# Patient Record
Sex: MALE | Race: BLACK OR AFRICAN AMERICAN | Employment: UNEMPLOYED | URBAN - METROPOLITAN AREA
[De-identification: names, ages, dates, MRNs, and addresses within clinical notes are randomized per-mention and may not be internally consistent; named-entity substitution may affect disease eponyms.]

---

## 2021-11-11 ENCOUNTER — HOSPITAL ENCOUNTER (EMERGENCY)
Facility: CLINIC | Age: 36
Discharge: HOME | End: 2021-11-12
Attending: EMERGENCY MEDICINE | Admitting: EMERGENCY MEDICINE
Payer: COMMERCIAL

## 2021-11-11 ENCOUNTER — APPOINTMENT (OUTPATIENT)
Dept: GENERAL RADIOLOGY | Facility: CLINIC | Age: 36
End: 2021-11-11
Attending: EMERGENCY MEDICINE
Payer: COMMERCIAL

## 2021-11-11 VITALS
RESPIRATION RATE: 18 BRPM | OXYGEN SATURATION: 98 % | HEART RATE: 92 BPM | SYSTOLIC BLOOD PRESSURE: 145 MMHG | TEMPERATURE: 103.3 F | DIASTOLIC BLOOD PRESSURE: 73 MMHG

## 2021-11-11 DIAGNOSIS — B34.9 VIRAL SYNDROME: ICD-10-CM

## 2021-11-11 DIAGNOSIS — Z20.822 SUSPECTED 2019 NOVEL CORONAVIRUS INFECTION: ICD-10-CM

## 2021-11-11 PROBLEM — N48.6 PEYRONIE DISEASE: Status: ACTIVE | Noted: 2019-10-11

## 2021-11-11 PROBLEM — G47.33 OSA (OBSTRUCTIVE SLEEP APNEA): Status: ACTIVE | Noted: 2021-11-11

## 2021-11-11 LAB — DEPRECATED S PYO AG THROAT QL EIA: NEGATIVE

## 2021-11-11 PROCEDURE — 87637 SARSCOV2&INF A&B&RSV AMP PRB: CPT | Performed by: EMERGENCY MEDICINE

## 2021-11-11 PROCEDURE — 71045 X-RAY EXAM CHEST 1 VIEW: CPT

## 2021-11-11 PROCEDURE — 99284 EMERGENCY DEPT VISIT MOD MDM: CPT | Mod: 25

## 2021-11-11 PROCEDURE — C9803 HOPD COVID-19 SPEC COLLECT: HCPCS

## 2021-11-11 PROCEDURE — 87651 STREP A DNA AMP PROBE: CPT | Performed by: EMERGENCY MEDICINE

## 2021-11-11 RX ORDER — IBUPROFEN 600 MG/1
600 TABLET, FILM COATED ORAL EVERY 6 HOURS PRN
Qty: 30 TABLET | Refills: 0 | Status: SHIPPED | OUTPATIENT
Start: 2021-11-11 | End: 2021-12-11

## 2021-11-11 ASSESSMENT — ENCOUNTER SYMPTOMS
FEVER: 1
HEADACHES: 1

## 2021-11-12 LAB
FLUAV RNA SPEC QL NAA+PROBE: NEGATIVE
FLUBV RNA RESP QL NAA+PROBE: NEGATIVE
GROUP A STREP BY PCR: NOT DETECTED
RSV RNA SPEC NAA+PROBE: NEGATIVE
SARS-COV-2 RNA RESP QL NAA+PROBE: NEGATIVE

## 2021-11-12 NOTE — RESULT ENCOUNTER NOTE
Negative for Influenza A, Influenza B, RSV and Covid19.  Patient will receive the Covid19 result via [employer] and a letter will be sent via [employer] (if active) or via the mail

## 2021-11-12 NOTE — ED PROVIDER NOTES
History   Chief Complaint:  Fever     The history is provided by the patient.      Akash James is a 36 year old male who presents for evaluation of fever which has been ongoing for the last 5 days.  The patient reports that he had a sleep study on Monday and since then he has had fevers, fatigue, cough, body aches, mild headaches, nausea and vomiting intermittently.  Patient is not COVID vaccinated.  He denies any other known ill contacts.  He denies any urinary symptoms, abdominal pain, rash.  No shortness of breath or chest pain.  Patient reports that he does not use medications and therefore has not taken anything to help with his symptoms. He states his temperatures at home have been as high as 103.  No confusion or lethargy.  Patient notes the most predominant symptom is his fatigue.    Review of Systems   Constitutional: Positive for fever.   Eyes: Positive for visual disturbance.   Neurological: Positive for headaches.   All other systems reviewed and are negative.    Allergies:  Cat  Dog    Medications:  Vitamin D    Past Medical History:    Peyronie disease  Caries  Paranoid schizophrenia  CARLY     Social History:  The patient presents to the ED alone.  Tobacco Use: patient denies smoking or vaping    Physical Exam     Patient Vitals for the past 24 hrs:   BP Temp Temp src Pulse Resp SpO2   11/11/21 2142 -- (!) 103.3  F (39.6  C) Oral -- -- --   11/11/21 2108 (!) 145/73 100.4  F (38  C) Temporal 92 18 98 %       Physical Exam  General: Well appearing, nontoxic. Resting comfortably  Head:  Scalp, face, and head appear normal  Eyes:  Pupils are equal, round    Conjunctivae non-injected and sclerae white  ENT:    The external nose is normal    Posterior pharynx is clear without erythema swelling exudates.  Mucous membranes moist.    Pinnae are normal  Neck:  Normal range of motion    There is no rigidity noted    Trachea is in the midline  CV:  Regular rate and rhythm     Normal S1/S2, no S3/S4    No  murmur or rub. Radial pulses 2+ bilaterally.  Resp:  Lungs are clear and equal bilaterally  There is no tachypnea    No increased work of breathing    No rales, wheezing, or rhonchi  GI:  Abdomen is soft, no rigidity or guarding    No distension, or mass    No tenderness or rebound tenderness   MS:  Normal muscular tone    Symmetric motor strength    No lower extremity edema  Skin:  No rash or acute skin lesions noted  Neuro: Awake and alert  Speech is normal and fluent  Moves all extremities spontaneously  Psych:  Normal affect. Appropriate interactions.     Emergency Department Course   Imaging:  XR Chest Port 1 View   Final Result   IMPRESSION: Negative chest.      Report per radiology     Laboratory:  Labs Ordered and Resulted from Time of ED Arrival to Time of ED Departure   STREPTOCOCCUS A RAPID SCREEN W REFELX TO PCR - Normal       Result Value    Group A Strep antigen Negative     GROUP A STREPTOCOCCUS PCR THROAT SWAB     Emergency Department Course:  Reviewed:  I reviewed nursing notes, vitals, past medical history and care everywhere    Assessments and Consults:  ED Course as of 11/12/21 0131   u Nov 11, 2021 2131  performed physical exam of the patient with findings as above.      Disposition:  The patient was discharged to home.     Impression & Plan   Covid-19  Akash James was evaluated during a global COVID-19 pandemic, which necessitated consideration that the patient might be at risk for infection with the SARS-CoV-2 virus that causes COVID-19.   Applicable protocols for evaluation were followed during the patient's care.   COVID-19 was considered as part of the patient's evaluation. The plan for testing is: a test was obtained during this visit.     Medical Decision Making:  Akash James is a 36 year old male who.  On my evaluation he is well-appearing, hemodynamically stable and afebrile.  No increased work of breathing, rest or distress or hypoxia.  Abdominal exam is benign  "without evidence of peritonitis or acute surgical emergency.  No abdominal complaints to indicate a primary intra-abdominal infection.  A broad differential diagnosis is considered.  He was febrile to 103.3 in the emergency department.  I offered the patient Tylenol and/or ibuprofen however he declined saying that \"I do not take any medications\".  I discussed with him that getting his fever down will help him feel better however he continued to refuse any antipyretics in the emergency department.  Chest x-ray obtained and negative for pneumonia or other acute findings.  Rapid strep test was completed and negative.  Influenza, COVID-19 and RSV viral testing are sent and pending at this time.  No evidence for skin or soft tissue infection.  Patient is overall very well-appearing, nontoxic.  At this time I feel that symptoms are due to underlying viral syndrome including possible COVID-19 infection.  Patient is not vaccinated.  Symptoms are mild at this point and there is no negation for further emergency department evaluation or hospitalization.  I recommended supportive care with Tylenol, ibuprofen, over-the-counter cough and cold medication, good oral fluid intake and rest.  Patient will be called if his viral testing returns positive.  I stressed the importance of close outpatient primary care follow-up and immediate return to the emergency department for any worsening.  The patient was agreeable with plan of care, close return precautions were provided and the patient was discharged in stable condition.    Diagnosis:    ICD-10-CM    1. Viral syndrome  B34.9    2. Suspected 2019 novel coronavirus infection  Z20.822      Discharge Medications:  Discharge Medication List as of 11/12/2021 12:41 AM      START taking these medications    Details   acetaminophen 500 MG CAPS Take 2 capsules by mouth every 8 hours as needed For aches, pain, fever, Disp-60 capsule, R-0, Local Print      ibuprofen (ADVIL/MOTRIN) 600 MG " tablet Take 1 tablet (600 mg) by mouth every 6 hours as needed for fever Take with food., Disp-30 tablet, R-0, Local Print           Scribe Disclosure:  I, Praneeth Emanuel, am serving as a scribe at 9:17 PM on 11/11/2021 to document services personally performed by Dimas Romero MD based on my observations and the provider's statements to me.     [facility]         Dimas Romero MD  11/13/21 6145

## 2021-11-13 ENCOUNTER — TELEPHONE (OUTPATIENT)
Dept: EMERGENCY MEDICINE | Facility: CLINIC | Age: 36
End: 2021-11-13
Payer: COMMERCIAL

## 2021-11-13 NOTE — TELEPHONE ENCOUNTER
Coronavirus (COVID-19) Notification    Lab Result   Lab test 2019-nCoV rRt-PCR OR SARS-COV-2 PCR    Nasopharyngeal AND/OR Oropharyngeal swab is NEGATIVE for 2019-nCoV RNA [OR] SARS-COV-2 RNA (COVID-19) RNA    Your result was negative. This means that we didn't find the virus that causes COVID-19 in your sample. A test may show negative when you do actually have the virus. This can happen when the virus is in the early stages of infection, before you feel illness symptoms.    If you have symptoms   Stay home and away from others (self-isolate) until you meet ALL of the guidelines below:    You've had no fever--and no medicine that reduces fever--for 1 full day (24 hours). And      Your other symptoms have gotten better. For example, your cough or breathing has improved. And     At least 10 days have passed since your symptoms started.       Group A Streptococcus PCR is NEGATIVE  No treatment or change in treatment [facility] ED lab result Strep Group A protocol.      Negative for Influenza A, Influenza B, RSV and Covid19.  Patient will receive the Covid19 result via [employer] and a letter will be sent via [employer] (if active) or via the mail    Advised to follow up with the PCP as directed by the ED provider, return to the ED with any worsening symptoms.  The patient is comfortable with the information given and has no further questions.       Vikki Lowe RN

## 2021-11-26 ENCOUNTER — APPOINTMENT (OUTPATIENT)
Dept: MRI IMAGING | Facility: CLINIC | Age: 36
End: 2021-11-26
Attending: INTERNAL MEDICINE
Payer: COMMERCIAL

## 2021-11-26 ENCOUNTER — APPOINTMENT (OUTPATIENT)
Dept: GENERAL RADIOLOGY | Facility: CLINIC | Age: 36
End: 2021-11-26
Attending: EMERGENCY MEDICINE
Payer: COMMERCIAL

## 2021-11-26 ENCOUNTER — APPOINTMENT (OUTPATIENT)
Dept: CT IMAGING | Facility: CLINIC | Age: 36
End: 2021-11-26
Attending: EMERGENCY MEDICINE
Payer: COMMERCIAL

## 2021-11-26 ENCOUNTER — HOSPITAL ENCOUNTER (INPATIENT)
Facility: CLINIC | Age: 36
LOS: 4 days | Discharge: HOME | End: 2021-11-30
Attending: EMERGENCY MEDICINE | Admitting: INTERNAL MEDICINE
Payer: COMMERCIAL

## 2021-11-26 DIAGNOSIS — R51.9 NONINTRACTABLE HEADACHE, UNSPECIFIED CHRONICITY PATTERN, UNSPECIFIED HEADACHE TYPE: ICD-10-CM

## 2021-11-26 DIAGNOSIS — K59.00 CONSTIPATION, UNSPECIFIED CONSTIPATION TYPE: Primary | ICD-10-CM

## 2021-11-26 DIAGNOSIS — R53.1 WEAKNESS GENERALIZED: ICD-10-CM

## 2021-11-26 DIAGNOSIS — G04.00 ADEM (ACUTE DISSEMINATED ENCEPHALOMYELITIS): ICD-10-CM

## 2021-11-26 LAB
ALBUMIN SERPL-MCNC: 3.6 G/DL (ref 3.4–5)
ALBUMIN UR-MCNC: NEGATIVE MG/DL
ALP SERPL-CCNC: 43 U/L (ref 40–150)
ALT SERPL W P-5'-P-CCNC: 15 U/L (ref 0–70)
ANION GAP SERPL CALCULATED.3IONS-SCNC: 3 MMOL/L (ref 3–14)
APPEARANCE CSF: CLEAR
APPEARANCE UR: CLEAR
AST SERPL W P-5'-P-CCNC: 12 U/L (ref 0–45)
ATRIAL RATE - MUSE: 82 BPM
BACTERIA #/AREA URNS HPF: ABNORMAL /HPF
BASOPHILS # BLD AUTO: 0 10E3/UL (ref 0–0.2)
BASOPHILS NFR BLD AUTO: 1 %
BILIRUB SERPL-MCNC: 0.5 MG/DL (ref 0.2–1.3)
BILIRUB UR QL STRIP: NEGATIVE
BUN SERPL-MCNC: 9 MG/DL (ref 7–30)
CALCIUM SERPL-MCNC: 9 MG/DL (ref 8.5–10.1)
CHLORIDE BLD-SCNC: 99 MMOL/L (ref 94–109)
CK SERPL-CCNC: 106 U/L (ref 30–300)
CK SERPL-CCNC: 115 U/L (ref 30–300)
CO2 SERPL-SCNC: 30 MMOL/L (ref 20–32)
COLOR CSF: COLORLESS
COLOR UR AUTO: ABNORMAL
CREAT SERPL-MCNC: 1.05 MG/DL (ref 0.66–1.25)
CRP SERPL-MCNC: <2.9 MG/L (ref 0–8)
DEPRECATED S PYO AG THROAT QL EIA: POSITIVE
DIASTOLIC BLOOD PRESSURE - MUSE: NORMAL MMHG
EOSINOPHIL # BLD AUTO: 0.1 10E3/UL (ref 0–0.7)
EOSINOPHIL NFR BLD AUTO: 2 %
ERYTHROCYTE [DISTWIDTH] IN BLOOD BY AUTOMATED COUNT: 11.9 % (ref 10–15)
ERYTHROCYTE [SEDIMENTATION RATE] IN BLOOD BY WESTERGREN METHOD: 16 MM/HR (ref 0–15)
FLUAV RNA SPEC QL NAA+PROBE: NEGATIVE
FLUBV RNA RESP QL NAA+PROBE: NEGATIVE
GFR SERPL CREATININE-BSD FRML MDRD: >90 ML/MIN/1.73M2
GLUCOSE BLD-MCNC: 112 MG/DL (ref 70–99)
GLUCOSE CSF-MCNC: 63 MG/DL (ref 40–70)
GLUCOSE UR STRIP-MCNC: NEGATIVE MG/DL
HCO3 BLDV-SCNC: 30 MMOL/L (ref 21–28)
HCT VFR BLD AUTO: 42.6 % (ref 40–53)
HGB BLD-MCNC: 14.8 G/DL (ref 13.3–17.7)
HGB UR QL STRIP: NEGATIVE
HOLD SPECIMEN: NORMAL
HSV1 DNA CSF QL NAA+PROBE: NOT DETECTED
HSV2 DNA CSF QL NAA+PROBE: NOT DETECTED
IMM GRANULOCYTES # BLD: 0 10E3/UL
IMM GRANULOCYTES NFR BLD: 0 %
INTERPRETATION ECG - MUSE: NORMAL
KETONES UR STRIP-MCNC: ABNORMAL MG/DL
LACTATE BLD-SCNC: 0.6 MMOL/L
LEUKOCYTE ESTERASE UR QL STRIP: NEGATIVE
LYMPH ABN NFR CSF MANUAL: 68 %
LYMPHOCYTES # BLD AUTO: 1.3 10E3/UL (ref 0.8–5.3)
LYMPHOCYTES NFR BLD AUTO: 32 %
Lab: NORMAL
Lab: NORMAL
MCH RBC QN AUTO: 31.2 PG (ref 26.5–33)
MCHC RBC AUTO-ENTMCNC: 34.7 G/DL (ref 31.5–36.5)
MCV RBC AUTO: 90 FL (ref 78–100)
MONOCYTES # BLD AUTO: 0.6 10E3/UL (ref 0–1.3)
MONOCYTES NFR BLD AUTO: 15 %
MONOS+MACROS NFR CSF MANUAL: 6 %
MUCOUS THREADS #/AREA URNS LPF: PRESENT /LPF
NEUTROPHILS # BLD AUTO: 2 10E3/UL (ref 1.6–8.3)
NEUTROPHILS NFR BLD AUTO: 50 %
NEUTROPHILS NFR CSF MANUAL: 26 %
NITRATE UR QL: NEGATIVE
NRBC # BLD AUTO: 0 10E3/UL
NRBC BLD AUTO-RTO: 0 /100
P AXIS - MUSE: 76 DEGREES
PCO2 BLDV: 49 MM HG (ref 40–50)
PERFORMING LABORATORY: NORMAL
PERFORMING LABORATORY: NORMAL
PH BLDV: 7.4 [PH] (ref 7.32–7.43)
PH UR STRIP: 6 [PH] (ref 5–7)
PLATELET # BLD AUTO: 320 10E3/UL (ref 150–450)
PO2 BLDV: 43 MM HG (ref 25–47)
POTASSIUM BLD-SCNC: 3.9 MMOL/L (ref 3.4–5.3)
PR INTERVAL - MUSE: 154 MS
PROT CSF-MCNC: 64 MG/DL (ref 15–60)
PROT SERPL-MCNC: 7.4 G/DL (ref 6.8–8.8)
QRS DURATION - MUSE: 78 MS
QT - MUSE: 336 MS
QTC - MUSE: 392 MS
R AXIS - MUSE: 73 DEGREES
RBC # BLD AUTO: 4.75 10E6/UL (ref 4.4–5.9)
RBC # CSF MANUAL: 109 /UL (ref 0–2)
RBC URINE: <1 /HPF
SAO2 % BLDV: 78 % (ref 94–100)
SARS-COV-2 RNA RESP QL NAA+PROBE: NEGATIVE
SODIUM SERPL-SCNC: 132 MMOL/L (ref 133–144)
SP GR UR STRIP: 1.01 (ref 1–1.03)
SPECIMEN STATUS: NORMAL
SPECIMEN STATUS: NORMAL
SYSTOLIC BLOOD PRESSURE - MUSE: NORMAL MMHG
T AXIS - MUSE: 45 DEGREES
TROPONIN I SERPL HS-MCNC: 6 NG/L
TSH SERPL DL<=0.005 MIU/L-ACNC: 2.12 MU/L (ref 0.4–4)
TUBE # CSF: 1
UROBILINOGEN UR STRIP-MCNC: 2 MG/DL
VENTRICULAR RATE- MUSE: 82 BPM
VIT B12 SERPL-MCNC: 227 PG/ML (ref 193–986)
WBC # BLD AUTO: 4 10E3/UL (ref 4–11)
WBC # CSF MANUAL: 32 /UL (ref 0–5)
WBC URINE: 1 /HPF

## 2021-11-26 PROCEDURE — 36415 COLL VENOUS BLD VENIPUNCTURE: CPT | Performed by: EMERGENCY MEDICINE

## 2021-11-26 PROCEDURE — 71046 X-RAY EXAM CHEST 2 VIEWS: CPT

## 2021-11-26 PROCEDURE — 99207 PR APP CREDIT; MD BILLING SHARED VISIT: CPT | Performed by: INTERNAL MEDICINE

## 2021-11-26 PROCEDURE — 258N000003 HC RX IP 258 OP 636: Performed by: EMERGENCY MEDICINE

## 2021-11-26 PROCEDURE — 87070 CULTURE OTHR SPECIMN AEROBIC: CPT | Performed by: EMERGENCY MEDICINE

## 2021-11-26 PROCEDURE — 84484 ASSAY OF TROPONIN QUANT: CPT | Performed by: EMERGENCY MEDICINE

## 2021-11-26 PROCEDURE — 999N000157 HC STATISTIC RCP TIME EA 10 MIN

## 2021-11-26 PROCEDURE — 84999 UNLISTED CHEMISTRY PROCEDURE: CPT | Performed by: PSYCHIATRY & NEUROLOGY

## 2021-11-26 PROCEDURE — 99223 1ST HOSP IP/OBS HIGH 75: CPT | Mod: AI | Performed by: INTERNAL MEDICINE

## 2021-11-26 PROCEDURE — 86140 C-REACTIVE PROTEIN: CPT | Performed by: EMERGENCY MEDICINE

## 2021-11-26 PROCEDURE — 87389 HIV-1 AG W/HIV-1&-2 AB AG IA: CPT | Performed by: INTERNAL MEDICINE

## 2021-11-26 PROCEDURE — 82550 ASSAY OF CK (CPK): CPT | Performed by: EMERGENCY MEDICINE

## 2021-11-26 PROCEDURE — 70553 MRI BRAIN STEM W/O & W/DYE: CPT

## 2021-11-26 PROCEDURE — 120N000004 HC R&B MS OVERFLOW

## 2021-11-26 PROCEDURE — 80053 COMPREHEN METABOLIC PANEL: CPT | Performed by: EMERGENCY MEDICINE

## 2021-11-26 PROCEDURE — 250N000011 HC RX IP 250 OP 636: Performed by: INTERNAL MEDICINE

## 2021-11-26 PROCEDURE — 250N000013 HC RX MED GY IP 250 OP 250 PS 637: Performed by: INTERNAL MEDICINE

## 2021-11-26 PROCEDURE — 81001 URINALYSIS AUTO W/SCOPE: CPT | Performed by: EMERGENCY MEDICINE

## 2021-11-26 PROCEDURE — 96361 HYDRATE IV INFUSION ADD-ON: CPT

## 2021-11-26 PROCEDURE — 86255 FLUORESCENT ANTIBODY SCREEN: CPT | Performed by: PSYCHIATRY & NEUROLOGY

## 2021-11-26 PROCEDURE — 36415 COLL VENOUS BLD VENIPUNCTURE: CPT | Performed by: PSYCHIATRY & NEUROLOGY

## 2021-11-26 PROCEDURE — 85652 RBC SED RATE AUTOMATED: CPT | Performed by: EMERGENCY MEDICINE

## 2021-11-26 PROCEDURE — 255N000002 HC RX 255 OP 636: Performed by: INTERNAL MEDICINE

## 2021-11-26 PROCEDURE — 82306 VITAMIN D 25 HYDROXY: CPT | Performed by: PSYCHIATRY & NEUROLOGY

## 2021-11-26 PROCEDURE — 82164 ANGIOTENSIN I ENZYME TEST: CPT | Performed by: PSYCHIATRY & NEUROLOGY

## 2021-11-26 PROCEDURE — 93005 ELECTROCARDIOGRAM TRACING: CPT

## 2021-11-26 PROCEDURE — 86235 NUCLEAR ANTIGEN ANTIBODY: CPT | Performed by: PSYCHIATRY & NEUROLOGY

## 2021-11-26 PROCEDURE — 62270 DX LMBR SPI PNXR: CPT

## 2021-11-26 PROCEDURE — 96375 TX/PRO/DX INJ NEW DRUG ADDON: CPT

## 2021-11-26 PROCEDURE — 87880 STREP A ASSAY W/OPTIC: CPT | Performed by: EMERGENCY MEDICINE

## 2021-11-26 PROCEDURE — 70450 CT HEAD/BRAIN W/O DYE: CPT

## 2021-11-26 PROCEDURE — 99285 EMERGENCY DEPT VISIT HI MDM: CPT | Mod: 25

## 2021-11-26 PROCEDURE — 87529 HSV DNA AMP PROBE: CPT | Performed by: EMERGENCY MEDICINE

## 2021-11-26 PROCEDURE — 72157 MRI CHEST SPINE W/O & W/DYE: CPT

## 2021-11-26 PROCEDURE — 250N000011 HC RX IP 250 OP 636: Performed by: EMERGENCY MEDICINE

## 2021-11-26 PROCEDURE — 86617 LYME DISEASE ANTIBODY: CPT | Performed by: EMERGENCY MEDICINE

## 2021-11-26 PROCEDURE — 96374 THER/PROPH/DIAG INJ IV PUSH: CPT | Mod: 59

## 2021-11-26 PROCEDURE — 86255 FLUORESCENT ANTIBODY SCREEN: CPT | Performed by: INTERNAL MEDICINE

## 2021-11-26 PROCEDURE — 82607 VITAMIN B-12: CPT | Performed by: PSYCHIATRY & NEUROLOGY

## 2021-11-26 PROCEDURE — 86618 LYME DISEASE ANTIBODY: CPT | Performed by: PSYCHIATRY & NEUROLOGY

## 2021-11-26 PROCEDURE — 72156 MRI NECK SPINE W/O & W/DYE: CPT

## 2021-11-26 PROCEDURE — 84157 ASSAY OF PROTEIN OTHER: CPT | Performed by: EMERGENCY MEDICINE

## 2021-11-26 PROCEDURE — 258N000003 HC RX IP 258 OP 636: Performed by: INTERNAL MEDICINE

## 2021-11-26 PROCEDURE — 82550 ASSAY OF CK (CPK): CPT | Performed by: INTERNAL MEDICINE

## 2021-11-26 PROCEDURE — 82945 GLUCOSE OTHER FLUID: CPT | Performed by: EMERGENCY MEDICINE

## 2021-11-26 PROCEDURE — 86780 TREPONEMA PALLIDUM: CPT | Performed by: PSYCHIATRY & NEUROLOGY

## 2021-11-26 PROCEDURE — 85004 AUTOMATED DIFF WBC COUNT: CPT | Performed by: EMERGENCY MEDICINE

## 2021-11-26 PROCEDURE — 82803 BLOOD GASES ANY COMBINATION: CPT

## 2021-11-26 PROCEDURE — 72158 MRI LUMBAR SPINE W/O & W/DYE: CPT

## 2021-11-26 PROCEDURE — 87252 VIRUS INOCULATION TISSUE: CPT | Performed by: EMERGENCY MEDICINE

## 2021-11-26 PROCEDURE — 94660 CPAP INITIATION&MGMT: CPT

## 2021-11-26 PROCEDURE — 89051 BODY FLUID CELL COUNT: CPT | Performed by: EMERGENCY MEDICINE

## 2021-11-26 PROCEDURE — 83916 OLIGOCLONAL BANDS: CPT | Performed by: EMERGENCY MEDICINE

## 2021-11-26 PROCEDURE — A9585 GADOBUTROL INJECTION: HCPCS | Performed by: INTERNAL MEDICINE

## 2021-11-26 PROCEDURE — 87040 BLOOD CULTURE FOR BACTERIA: CPT | Performed by: EMERGENCY MEDICINE

## 2021-11-26 PROCEDURE — 87636 SARSCOV2 & INF A&B AMP PRB: CPT | Performed by: EMERGENCY MEDICINE

## 2021-11-26 PROCEDURE — C9803 HOPD COVID-19 SPEC COLLECT: HCPCS

## 2021-11-26 PROCEDURE — 86225 DNA ANTIBODY NATIVE: CPT | Performed by: PSYCHIATRY & NEUROLOGY

## 2021-11-26 PROCEDURE — 84443 ASSAY THYROID STIM HORMONE: CPT | Performed by: PSYCHIATRY & NEUROLOGY

## 2021-11-26 RX ORDER — METOCLOPRAMIDE HYDROCHLORIDE 5 MG/ML
10 INJECTION INTRAMUSCULAR; INTRAVENOUS ONCE
Status: COMPLETED | OUTPATIENT
Start: 2021-11-26 | End: 2021-11-26

## 2021-11-26 RX ORDER — ONDANSETRON 2 MG/ML
4 INJECTION INTRAMUSCULAR; INTRAVENOUS ONCE
Status: COMPLETED | OUTPATIENT
Start: 2021-11-26 | End: 2021-11-26

## 2021-11-26 RX ORDER — AMOXICILLIN 250 MG
2 CAPSULE ORAL 2 TIMES DAILY PRN
Status: DISCONTINUED | OUTPATIENT
Start: 2021-11-26 | End: 2021-11-30 | Stop reason: HOSPADM

## 2021-11-26 RX ORDER — ONDANSETRON 2 MG/ML
4 INJECTION INTRAMUSCULAR; INTRAVENOUS EVERY 6 HOURS PRN
Status: DISCONTINUED | OUTPATIENT
Start: 2021-11-26 | End: 2021-11-30 | Stop reason: HOSPADM

## 2021-11-26 RX ORDER — ONDANSETRON 4 MG/1
4 TABLET, ORALLY DISINTEGRATING ORAL EVERY 6 HOURS PRN
Status: DISCONTINUED | OUTPATIENT
Start: 2021-11-26 | End: 2021-11-30 | Stop reason: HOSPADM

## 2021-11-26 RX ORDER — DIPHENHYDRAMINE HYDROCHLORIDE 50 MG/ML
25 INJECTION INTRAMUSCULAR; INTRAVENOUS ONCE
Status: DISCONTINUED | OUTPATIENT
Start: 2021-11-26 | End: 2021-11-26

## 2021-11-26 RX ORDER — ACETAMINOPHEN 650 MG/1
650 SUPPOSITORY RECTAL EVERY 6 HOURS PRN
Status: DISCONTINUED | OUTPATIENT
Start: 2021-11-26 | End: 2021-11-30 | Stop reason: HOSPADM

## 2021-11-26 RX ORDER — ACETAMINOPHEN 325 MG/1
975 TABLET ORAL ONCE
Status: DISCONTINUED | OUTPATIENT
Start: 2021-11-26 | End: 2021-11-26

## 2021-11-26 RX ORDER — GADOBUTROL 604.72 MG/ML
10 INJECTION INTRAVENOUS ONCE
Status: COMPLETED | OUTPATIENT
Start: 2021-11-26 | End: 2021-11-26

## 2021-11-26 RX ORDER — ERGOCALCIFEROL 1.25 MG/1
50000 CAPSULE ORAL WEEKLY
COMMUNITY

## 2021-11-26 RX ORDER — MAGNESIUM HYDROXIDE/ALUMINUM HYDROXICE/SIMETHICONE 120; 1200; 1200 MG/30ML; MG/30ML; MG/30ML
30 SUSPENSION ORAL EVERY 4 HOURS PRN
Status: DISCONTINUED | OUTPATIENT
Start: 2021-11-26 | End: 2021-11-30 | Stop reason: HOSPADM

## 2021-11-26 RX ORDER — LIDOCAINE HYDROCHLORIDE 40 MG/ML
1 INJECTION, SOLUTION RETROBULBAR ONCE
Status: DISCONTINUED | OUTPATIENT
Start: 2021-11-26 | End: 2021-11-26

## 2021-11-26 RX ORDER — AMOXICILLIN 250 MG
1 CAPSULE ORAL 2 TIMES DAILY PRN
Status: DISCONTINUED | OUTPATIENT
Start: 2021-11-26 | End: 2021-11-30 | Stop reason: HOSPADM

## 2021-11-26 RX ORDER — LIDOCAINE 40 MG/G
CREAM TOPICAL
Status: DISCONTINUED | OUTPATIENT
Start: 2021-11-26 | End: 2021-11-30 | Stop reason: HOSPADM

## 2021-11-26 RX ORDER — LANOLIN ALCOHOL/MO/W.PET/CERES
3 CREAM (GRAM) TOPICAL
Status: DISCONTINUED | OUTPATIENT
Start: 2021-11-26 | End: 2021-11-30 | Stop reason: HOSPADM

## 2021-11-26 RX ORDER — KETOROLAC TROMETHAMINE 15 MG/ML
10 INJECTION, SOLUTION INTRAMUSCULAR; INTRAVENOUS ONCE
Status: COMPLETED | OUTPATIENT
Start: 2021-11-26 | End: 2021-11-26

## 2021-11-26 RX ORDER — ACETAMINOPHEN 325 MG/1
650 TABLET ORAL EVERY 6 HOURS PRN
Status: DISCONTINUED | OUTPATIENT
Start: 2021-11-26 | End: 2021-11-30 | Stop reason: HOSPADM

## 2021-11-26 RX ORDER — BISACODYL 10 MG
10 SUPPOSITORY, RECTAL RECTAL DAILY PRN
Status: DISCONTINUED | OUTPATIENT
Start: 2021-11-26 | End: 2021-11-30 | Stop reason: HOSPADM

## 2021-11-26 RX ADMIN — METOCLOPRAMIDE HYDROCHLORIDE 10 MG: 5 INJECTION INTRAMUSCULAR; INTRAVENOUS at 05:04

## 2021-11-26 RX ADMIN — SODIUM CHLORIDE 1000 MG: 9 INJECTION, SOLUTION INTRAVENOUS at 14:59

## 2021-11-26 RX ADMIN — GADOBUTROL 8 ML: 604.72 INJECTION INTRAVENOUS at 10:49

## 2021-11-26 RX ADMIN — ONDANSETRON 4 MG: 2 INJECTION INTRAMUSCULAR; INTRAVENOUS at 02:01

## 2021-11-26 RX ADMIN — PENICILLIN G BENZATHINE 1.2 MILLION UNITS: 1200000 INJECTION, SUSPENSION INTRAMUSCULAR at 12:30

## 2021-11-26 RX ADMIN — MAGNESIUM HYDROXIDE 30 ML: 400 SUSPENSION ORAL at 18:27

## 2021-11-26 RX ADMIN — SODIUM CHLORIDE 1000 ML: 9 INJECTION, SOLUTION INTRAVENOUS at 01:44

## 2021-11-26 RX ADMIN — KETOROLAC TROMETHAMINE 10 MG: 15 INJECTION, SOLUTION INTRAMUSCULAR; INTRAVENOUS at 03:53

## 2021-11-26 ASSESSMENT — ENCOUNTER SYMPTOMS
FEVER: 1
VOMITING: 1
NAUSEA: 1
ARTHRALGIAS: 1
HEADACHES: 1
NUMBNESS: 1
CHILLS: 1
COUGH: 0
WEAKNESS: 0
CONSTIPATION: 1
UNEXPECTED WEIGHT CHANGE: 1
DYSURIA: 0
MYALGIAS: 1
BACK PAIN: 0
HALLUCINATIONS: 0
PHOTOPHOBIA: 1

## 2021-11-26 ASSESSMENT — MIFFLIN-ST. JEOR
SCORE: 1564.49
SCORE: 1563.12

## 2021-11-26 ASSESSMENT — ACTIVITIES OF DAILY LIVING (ADL)
ADLS_ACUITY_SCORE: 14
ADLS_ACUITY_SCORE: 12
ADLS_ACUITY_SCORE: 6
ADLS_ACUITY_SCORE: 12
ADLS_ACUITY_SCORE: 6
ADLS_ACUITY_SCORE: 8
ADLS_ACUITY_SCORE: 8
ADLS_ACUITY_SCORE: 12
ADLS_ACUITY_SCORE: 8
ADLS_ACUITY_SCORE: 12
ADLS_ACUITY_SCORE: 12

## 2021-11-26 NOTE — ED NOTES
Attempted to ambulate patient, patient reports numbness to bilateral lower extremities causing difficulty walking. Patient denies trauma

## 2021-11-26 NOTE — ED PROVIDER NOTES
History   Chief Complaint:  Flu Symptoms     The history is provided by the patient and a significant other.      Akash James is a 36 year old male who presents with flu symptoms. Patient seen here on 11/11 for similar symptoms and had negative Covid, strep, and flu tests. Returns today for ongoing headache, myalgias, nausea and vomiting. He rates his headache as a 10/10 for the past three weeks. He complains of left sided and posterior headache. Presents today because family concerned and he is declining since his previous visit. He has lost 15 pounds. Girlfriend notes he has urinated on himself because he can't feel anything in that area. He is also constipated. He notes numbness down the anterior of his bilateral legs. He has been having fevers with high of 103 two weeks ago with continued fever and chills. He has bilateral leg tingling and unable to walk which started recently. He is photophobic. He notes blurriness out of one eye. These symptoms have been ongoing for three weeks. Denies dysuria or cough. No rash. No back pain. No weakness. Denies hallucinations or thoughts of wanting to harm himself.     Review of Systems   Constitutional: Positive for chills, fever and unexpected weight change.   Eyes: Positive for photophobia and visual disturbance.   Respiratory: Negative for cough.    Gastrointestinal: Positive for constipation, nausea and vomiting.   Genitourinary: Negative for dysuria and enuresis.   Musculoskeletal: Positive for arthralgias, gait problem and myalgias. Negative for back pain.   Skin: Negative for rash.   Neurological: Positive for numbness and headaches. Negative for weakness.   Psychiatric/Behavioral: Negative for hallucinations and suicidal ideas.   All other systems reviewed and are negative.    Allergies:  Dog  Uncaria tomentosa    Medications:  The patient denies use of medications.     Past Medical History:     Peyronie disease  Paranoid schizophrenia  CARLY  Caries    Chlamydia  Gonorrhea     Migraine without aura     Social History:  Presents to ED with girlfriend  No alcohol, tobacco, or drug use.     Physical Exam     Patient Vitals for the past 24 hrs:   BP Temp Temp src Pulse Resp SpO2   11/26/21 0303 -- -- -- 90 18 99 %   11/26/21 0045 105/71 98.3  F (36.8  C) Oral 94 20 98 %       Physical Exam  Constitutional:  Oriented to person, place, and time. Minor distress.   HENT:   Head:    Normocephalic. TM's clear.   Mouth/Throat:   Oropharynx is clear and moist. No erythema.   Eyes:    EOM are normal. Pupils are equal, round, and reactive to light.   Neck:    Neck supple.   Cardiovascular:  Normal rate, regular rhythm and normal heart sounds.      Exam reveals no gallop and no friction rub.       No murmur heard.  Pulmonary/Chest:  Effort normal and breath sounds normal.      No respiratory distress. No wheezes. No rales.      No reproducible chest wall pain.  Abdominal:   Soft. No distension. No tenderness. No rebound and no guarding.   Musculoskeletal:  Normal range of motion.   Neurological:   Alert and oriented to person, place, and time.           Moves all 4 extremities spontaneously.     Cranial nerves intact. Sensation intact to light touch in all four extremities.      Strength intact to bilateral upper extremities.      4/5 strength dorsiflexion to bilateral feet otherwise strength intact.      Hyper reflexive noted to knee and ankle tendons. No meningeal signs.   Skin:    No rash noted. No pallor.     Emergency Department Course   ECG  ECG obtained at 0137, ECG read at 0137  Normal sinus rhythm  Early repolarization  Normal ECG   Rate 82 bpm. ME interval 154 ms. QRS duration 78 ms. QT/QTc 336/392 ms. P-R-T axes 76 73 45.     Imaging:  XR Chest 2 Views   Final Result   IMPRESSION: Heart size is normal. Lungs remain clear. No visible pneumothorax.      Head CT w/o contrast   Final Result   IMPRESSION:   1.  No acute intracranial process.      MR Brain w/o & w  Contrast    (Results Pending)   MR Cervical Spine w/o & w Contrast    (Results Pending)   MR Thoracic Spine w/o & w Contrast    (Results Pending)   Lumbar spine MRI w & w/o contrast - surgery <10yrs    (Results Pending)   Report per radiology    Laboratory:  Labs Ordered and Resulted from Time of ED Arrival to Time of ED Departure   COMPREHENSIVE METABOLIC PANEL - Abnormal       Result Value    Sodium 132 (*)     Potassium 3.9      Chloride 99      Carbon Dioxide (CO2) 30      Anion Gap 3      Urea Nitrogen 9      Creatinine 1.05      Calcium 9.0      Glucose 112 (*)     Alkaline Phosphatase 43      AST 12      ALT 15      Protein Total 7.4      Albumin 3.6      Bilirubin Total 0.5      GFR Estimate >90     ROUTINE UA WITH MICROSCOPIC - Abnormal    Color Urine Light Yellow      Appearance Urine Clear      Glucose Urine Negative      Bilirubin Urine Negative      Ketones Urine Trace (*)     Specific Gravity Urine 1.011      Blood Urine Negative      pH Urine 6.0      Protein Albumin Urine Negative      Urobilinogen Urine 2.0      Nitrite Urine Negative      Leukocyte Esterase Urine Negative      Bacteria Urine Few (*)     Mucus Urine Present (*)     RBC Urine <1      WBC Urine 1     ERYTHROCYTE SEDIMENTATION RATE AUTO - Abnormal    Erythrocyte Sedimentation Rate 16 (*)    ISTAT GASES LACTATE VENOUS POCT - Abnormal    Lactic Acid POCT 0.6      Bicarbonate Venous POCT 30 (*)     O2 Sat, Venous POCT 78 (*)     pCO2V Venous POCT 49      pH Venous POCT 7.40      pO2 Venous POCT 43     PROTEIN TOTAL CSF - Abnormal    Protein total CSF 64 (*)    CELL COUNT CSF - Abnormal    Tube Number 1      Color Colorless      Clarity Clear      Total Nucleated Cells 32 (*)     RBC Count 109 (*)    STREPTOCOCCUS A RAPID SCREEN W REFELX TO PCR - Abnormal    Group A Strep antigen Positive (*)    INFLUENZA A/B & SARS-COV2 PCR MULTIPLEX - Normal    Influenza A PCR Negative      Influenza B PCR Negative      SARS CoV2 PCR Negative     CK  TOTAL - Normal         CRP INFLAMMATION - Normal    CRP Inflammation <2.9     TROPONIN I - Normal    Troponin I High Sensitivity 6     GLUCOSE CSF - Normal    Glucose CSF 63     CBC WITH PLATELETS AND DIFFERENTIAL    WBC Count 4.0      RBC Count 4.75      Hemoglobin 14.8      Hematocrit 42.6      MCV 90      MCH 31.2      MCHC 34.7      RDW 11.9      Platelet Count 320      % Neutrophils 50      % Lymphocytes 32      % Monocytes 15      % Eosinophils 2      % Basophils 1      % Immature Granulocytes 0      NRBCs per 100 WBC 0      Absolute Neutrophils 2.0      Absolute Lymphocytes 1.3      Absolute Monocytes 0.6      Absolute Eosinophils 0.1      Absolute Basophils 0.0      Absolute Immature Granulocytes 0.0      Absolute NRBCs 0.0     DIFFERENTIAL CSF    % Neutrophils 26      % Lymphocytes 68      % Monocytes/Macrophages 6     LYME IGG AND IGM CSF IMMUNOBLOT   OLIGOCLONAL BANDING   OLIGOCLONAL BANDING   SERUM COLLECTION   BLOOD CULTURE   BLOOD CULTURE   GRAM STAIN   AEROBIC BACTERIAL CULTURE ROUTINE   HERPES SIMPLEX VIRUS 1&2 PCR   VIRAL CULTURE NON-RESPIRATORY   CELL COUNT WITH DIFFERENTIAL CSF        Procedures     Lumbar Puncture         INDICATION:  headache, rule out meningismus and leg weakness      CONSENT:  Risks (including but not limited to; infection, bleeding, spinal headache with possibility of spinal patch and temporary or permanent neurologic injury), benefits and alternatives were discussed with patient and consent for procedure was obtained.    TIMEOUT:  Universal protocol was followed. TIME OUT conducted just prior to starting procedure confirmed patient identity, site/side, procedure, patient position, and availability of correct equipment and implants? Yes      MEDICATIONS:  Lidocaine: 3 ml    PROCEDURAL NOTE:  Patient was placed in a sitting, supported by bedside stand position.  The low back was prepped with Betadine.  The patient was medicated as above.  A spinal needle was used to gain  access to the subarachnoid space with stylet in place. The fluid was clear.  Stylet was replaced and needle withdrawn.    PATIENT STATUS:  Patient tolerated the procedure well.  There were no complications.       Emergency Department Course:  Reviewed:  I reviewed nursing notes, vitals, past medical history and Care Everywhere    Assessments:  0116 I obtained history and examined the patient as noted above.   0325 I rechecked and updated the patient.   0353 I rechecked the patient and performed lumbar puncture as noted above.   0547 I rechecked the patient and explained findings.     Consults:  0601 I spoke with Dr. Daniela Saez from [facility] regarding patient's presentation, findings, and plan of care.     0621 I spoke with Dr. Davis from the Hospitalist service regarding patient's presentation, findings, and plan of care.     Interventions:  0144 NS, 1L, IV  0201 Zofran, 4 mg, IV  0353 Toradol, 10 mg, IV  0504 Reglan, 10 mg, IV    Disposition:  The patient was admitted to the hospital under the care of Dr. Davis.     Impression & Plan         Medical Decision Making:  Mr. James is a 36 year old male who came in for headache ongoing for the past three weeks, fevers, and increasing weakness and unable to walk. On exam I noted that he had bilateral weakened dorsiflexion, hyper reflexive. Complains of severe headache. Differential includes meningitis, ADEM, transverse myelitis, guillain-barre syndrome, or other causes. Workup thus far shows he has a positive strep test but no clinical signs of strep pharyngitis, minimally elevated ESR, nonspecific findings. I did end up doing a lumbar puncture which shows minimally elevated proteins, WBC and RBCs. This would not be consistent with pure meningitis I see no reason for IV antibiotics at this time. Cultures are currently pending.  I did discuss the case with neurology who did request MRI brain and spine. Upon reevaluation patient has difficulty  ambulating due to unsteady gait. I do not believe he is safe to be discharged at this time. He will be admitted for close monitoring.      Diagnosis:    ICD-10-CM    1. Weakness generalized  R53.1    2. Nonintractable headache, unspecified chronicity pattern, unspecified headache type  R51.9        Covid-19  Akash James was evaluated during a global COVID-19 pandemic, which necessitated consideration that the patient might be at risk for infection with the SARS-CoV-2 virus that causes COVID-19.   Applicable protocols for evaluation were followed during the patient's care.   COVID-19 was considered as part of the patient's evaluation. The plan for testing is:  a test was obtained during this visit.    Scribe Disclosure:  DARLEEN, James Bird, am serving as a scribe at 1:03 AM on 11/26/2021 to document services personally performed by Dimas Dang MD based on my observations and the provider's statements to me.            Dimas Dang MD  11/26/21 0653       Dimas Dang MD  12/02/21 1007

## 2021-11-26 NOTE — PLAN OF CARE
DX: Gen weakness  Tele: na  A&O x4  Activity: A-1 pivot bedside commode   Diet: Advance as tolerated  VSS: Q8  O2: RA  BG: na  PIV: SL RA  Pain: yes always  GI/: continent  Plan: need stool sample  Discharge: TBD continue plan of care   Pt transferred to [phone]

## 2021-11-26 NOTE — PROGRESS NOTES
[facility]  Hospitalist Progress Note    Mj Lorenzo MD 11/26/2021  Text Page      Reason for Stay (Diagnosis): Headache, leg weakness and inability to walk.         Assessment and Plan:      Summary of Stay:Akash James is a 36 year old male with a history of migraines, recently diagnosed CARLY and prescribed CPAP, paranoid schizophrenia (but on no medications),  admitted on 11/26/2021  with 3 week hx of headache, ongoing fevers, n/v/d, bilateral LE weakness and now with inability to walk and SO is unable to care for patient      He was seen in the ER 11/11/21 for 5 day hx of fevers/cough/bodyaches/headache/nausea but no vomiting.  His temps were as high as 103.  COVID negative as was other workup, and he was discharged home.      Since that time he has had ongoing n/v/d, poor po and a reported 15 pound weight loss.  He states his headache is from his forehead to his occiput but spares the sides of his heads.   No vision changes. He is photophobic.  His bilateral numbness/weakness of his LE has led to his inability to walk on his own.  His SO brought him into the ER for evaluation because she is unable to meet his needs at home.    MRI of the brain and spinal cord revealed diffuse inflammatory process concerning for multiple sclerosis or neuromyelitis optica versus much less likely infectious etiology.    Had LP and CSF has elevated lymphocyte count.     Problem List:   1. CNS inflammatory process concerning for multiple sclerosis or neuromyelitis optica:  --I discussed with neurology  --Start Solu-Medrol 1 g IV daily for 5 days  --If no improvement after 5 days he would need to xfer to another facility for plasmapharesis.  --Await CSF studies including a oligoclonal banding, viral studies and cultures.  --Check serum RPR, and NMO antibodies, ACE level, SSA, SSB and CAMACHO  --I discussed with the patient and SO.  --Formal neuro consult  --PT/OT    2. Strep throat  Throat exam is  unremarkable but strep was positive last ER visit  -single dose of IM pcn G     3.  CARLY  Diagnosed earlier this month and given a Rx for CPAP machine:  -will have RT place at night, high risk for decompensation if receiving sedating agents    4. Hx of schizophrenia:  --Currently not on medications    5.  Constipation:  --Has tried oral agents.  -try suppository, enema        DVT Prophylaxis: Pneumatic Compression Devices  Lines: PIV PIV  Madrigal: None  Code Status: Full Code  Disposition Plan   Expected discharge in 5 days to prior living arrangement once symptoms improve.     Entered: Mj Lorenzo 11/26/2021, 2:34 PM               Interval History (Subjective):      Unchanged from this morning when seen by Dr. Davis.                  Physical Exam:      Last Vital Signs:  /66 (BP Location: Left arm)   Pulse 80   Temp 98.5  F (36.9  C) (Oral)   Resp 16   SpO2 97%         Vital signs reviewed  General:  Alert, calm, NAD  Deferred         Medications:      All current medications were reviewed with changes reflected in problem list.         Data:      All new lab and imaging data was reviewed.   Labs:  MRI of brain, C/T/L spine:  Head MRI:  1.  Interval development of a T2/FLAIR hyperintensity involving the  medulla, the medial aspect of the cerebellar hemispheres, and the  dorsal dhaval along the anterior margin of the fourth ventricle just to  the left of midline. There is an accompanying parenchymal enhancement  in these areas, with suggestion of a small amount of leptomeningeal  enhancement at the cervicomedullary junction. Given the submitted  clinical history, this raises concern for active demyelination in the  setting of ADEM or multiple sclerosis. Anti-MOG antibody disease could  generate a similar appearance. Given the possibility of a small amount  of leptomeningeal enhancement, consideration could be given towards  meningitis or possible sarcoid in the appropriate clinical  setting.     Cervical spine MRI:  1.  Expansile T2 hyperintense cord signal abnormality extending from  the inferior C4 to the superior C7 level with rounded foci of  accompanying enhancement at the C4 and C5-C6 levels. This raises  concern for transverse myelitis, with the overall clinical picture  suggesting the possibility of ADEM (or possible Anti-MOG antibody  disease) with cord involvement. Active demyelination in the setting of  multiple sclerosis should also be considered. Infectious transverse  myelitis is thought less likely but provides additional consideration.     Thoracic spine MRI:  1.  Enhancing T2/STIR hyperintense cord lesion at the T10 level  measuring up to 2.7 cm craniocaudal. No additional cord signal  abnormality or abnormal cord enhancement elsewhere. Differential  considerations as outlined in the cervical spine MRI impression still  apply.     Lumbar spine MRI:  1.  No abnormal enhancement in the cauda equina nerve roots or  visualized cord signal abnormality in the visualized inferior aspect  of the spinal cord.     2.  No significant degenerative change in the lumbar spine. Spinal  canal and neuroforamina are widely patent.

## 2021-11-26 NOTE — H&P
History and Physical     kAash James MRN# [id number]   YOB: 1985 Age: 36 year old      Date of Admission:  11/26/2021    Primary care provider: No Ref-Primary, Physician          Assessment and Plan:     Summary of Stay: Akash James is a 36 year old male with a history of migraines, ? Diagnosis of gonorrhea (I am unable to locate any positive test), recently diagnosed CARLY and prescribed CPAP, paranoid schizophrenia (but on no medications),  admitted on 11/26/2021  with 3 week hx of headache, ongoing fevers, n/v/d, bilateral LE weakness and now with inability to walk and SO is unable to care for patient    The patient received some metoclopramide for his headache which has unfortunately left him quite somnolent, I tried to call his emergency contact although that number was disconnected. I tried multiple times to waken patient which he will do but then goes right back to sleep     He was seen in the ER 11/11/21 for 5 day hx of fevers/cough/bodyaches/headache/nausea but no vomiting.  His temps were as high as 103.  COVID negative as was other workup, and he was discharged home.     Since that time he has had ongoing n/v/d, poor po and a reported 15 pound weight loss.  He states his headache is from his forehead to his occiput but spares the sides of his heads.  He has found nothing that helps it improve, he is however able to sleep but when he wakes it is still present.  No vision changes per discussion with ER.  It's not clear to me if the n/v is related to the headaches or a GI process,  He is photophobic.  Headache worsens with bending over but he denies any sinus pressure.  His bilateral numbness/weakness of his LE has led to his inability to walk on his own.  His SO brought him into the ER for evaluation because she is unable to meet his needs at home.    eval in the ER shows stable VS and he is afebrile.  CMP is wnl, trop is wnl, CRP undetectable, ESR 16.  UA is negative.  Strep throat  was positive.  LP performed with 109 RBC 32 WBC predominately lymphocytes.  covid and influenza testing are negative    Per ER MD he had 4/5 dorsiflexion and hyperreflexia at the patellar/achilles regions.  I am unable to perform a satisfactory neurologic exam.      Neurology was contacted from the ER and recommend MRI brain, c spine, t spine, and l spine to r/o demyelinating process      Problem List:   3 week hx of headache/n/v/d/fever and progressive bilateral LE weakness  Sounds like a postinflammatory process but need to r/o structural component.   -neuro consult  -MRIs ordered in ER  -metoclopramide actually worked well from a sx standpoint, will continue on a prn basis  -check CSF tube 4 for RBCs to ensure it clears  -check CK  -stool studies/c diff    Strep throat  Throat exam is unremarkable but strep was negative last ER visit  -single dose of IM pcn G    CARLY  Diagnosed earlier this month and given a Rx for CPAP machine, I'm unsure if he's using it.   -will have RT place at night, high risk for decompensation if receiving sedating agents    COVID 19 negative  NOT vaccinated      DVT Prophylaxis: Pneumatic Compression Devices  Code Status: Full Code  Functional Status: previously independent, now apparently   Madrigal:   Monitor with bladder scans  Access:  PIV          We are operating under sub optimal conditions in the setting of a world wide pandemic, hospitals are running at full capacity with limited ICU bed availability. We are providing the best possible patient care with limited resources.           Chief Complaint:     Multiple complaints       History of Present Illness:   Akash James is a 36 year old male with a history of migraines, ? Diagnosis of gonorrhea (I am unable to locate any positive test), recently diagnosed CARLY and prescribed CPAP, paranoid schizophrenia (but on no medications),  admitted on 11/26/2021  with 3 week hx of headache, ongoing fevers, n/v/d, bilateral LE weakness and  now with inability to walk and SO is unable to care for patient    The patient received some metoclopramide for his headache which has unfortunately left him quite somnolent, I tried to call his emergency contact although that number was disconnected. I tried multiple times to waken patient which he will do but then goes right back to sleep     He was seen in the ER 11/11/21 for 5 day hx of fevers/cough/bodyaches/headache/nausea but no vomiting.  His temps were as high as 103.  COVID negative as was other workup, and he was discharged home.     Since that time he has had ongoing n/v/d, poor po and a reported 15 pound weight loss.  He states his headache is from his forehead to his occiput but spares the sides of his heads.  He has found nothing that helps it improve, he is however able to sleep but when he wakes it is still present.  No vision changes per discussion with ER.  It's not clear to me if the n/v is related to the headaches or a GI process,  He is photophobic.  Headache worsens with bending over but he denies any sinus pressure.  His bilateral numbness/weakness of his LE has led to his inability to walk on his own.  His SO brought him into the ER for evaluation because she is unable to meet his needs at home.    eval in the ER shows stable VS and he is afebrile.  CMP is wnl, trop is wnl, CRP undetectable, ESR 16.  UA is negative.  Strep throat was positive.  LP performed with 109 RBC 32 WBC predominately lymphocytes.  covid and influenza testing are negative    Per ER MD he had 4/5 dorsiflexion and hyperreflexia at the patellar/achilles regions.  I am unable to perform a satisfactory neurologic exam.      Neurology was contacted from the ER and recommend MRI brain, c spine, t spine, and l spine to r/o demyelinating process        The history is obtained in discussion with the ER provider Dr Dang, extensive review of epic and care everywhere.  The patient is only able to provide limited history due to  sedation          Past Medical History:   CARLY-prescribed CPAP unable to determine if he is using   Migraines  ? Schizophrenia, patient denies and is not on medication  ? Hx of gonorrhea documented testing all negative          Past Surgical History:   reviewed          Social History:     Lives with SO.  Denies etoh and alcohol         Family History:   I have reviewed this patient's family history         Allergies:     Allergies   Allergen Reactions     Dog Epithelium Itching and Shortness Of Breath     Other reaction(s): Cough     Uncaria Tomentosa (Cats Claw) Cough, Itching and Shortness Of Breath             Medications:   Reportedly none            Review of Systems:     A Comprehensive greater than 10 system review of systems was carried out.  Pertinent positives and negatives are noted above.  Otherwise negative for contributory information.           Physical Exam:   Blood pressure 105/71, pulse 90, temperature 98.3  F (36.8  C), temperature source Oral, resp. rate 18, SpO2 99 %.  Exam:    General: laying on gurney in ED, sleeping, wakens to voice answers a question then drifts back off to sleep.  HEENT:  Head nc/at sclera clear PERRL O/P:  Mask in place.  Neck is supple  Lungs: cta b nl effort   CV:  RRR no m/r/g no le edema  Abd:  S/nt/nd no r/g  Neuro:  Unable to accurately assess  To sleepy to address orientation or assess affectation   Skin:  W/d no c/c               Data:     Lab Results   Component Value Date    PH 7.40 11/26/2021     Lab Results   Component Value Date    WBC 4.0 11/26/2021    HGB 14.8 11/26/2021    HCT 42.6 11/26/2021    MCV 90 11/26/2021     11/26/2021     Lab Results   Component Value Date     (H) 11/26/2021     Lab Results   Component Value Date    AST 12 11/26/2021    ALT 15 11/26/2021    GGT 14 09/12/2008    ALKPHOS 43 11/26/2021    BILITOTAL 0.5 11/26/2021     Lab Results   Component Value Date    TROPONIN <0.04 06/30/2007            Imaging:     Recent Results  (from the past 24 hour(s))   Head CT w/o contrast    Narrative    EXAM: CT HEAD W/O CONTRAST  LOCATION: [facility]  DATE/TIME: 11/26/2021 2:23 AM    INDICATION: Headache, intracranial hemorrhage suspected  COMPARISON: 09/12/2008  TECHNIQUE: Routine CT Head without IV contrast. Multiplanar reformats. Dose reduction techniques were used.    FINDINGS:  INTRACRANIAL CONTENTS: No intracranial hemorrhage, extraaxial collection, or mass effect.  No CT evidence of acute infarct. Normal parenchymal attenuation. Normal ventricles and sulci.     VISUALIZED ORBITS/SINUSES/MASTOIDS: No intraorbital abnormality. No paranasal sinus mucosal disease. No middle ear or mastoid effusion.    BONES/SOFT TISSUES: No acute abnormality.      Impression    IMPRESSION:  1.  No acute intracranial process.   XR Chest 2 Views    Narrative    EXAM: XR CHEST 2 VW  LOCATION: [facility]  DATE/TIME: 11/26/2021 2:31 AM    INDICATION: Fever  COMPARISON: 11/11/2021      Impression    IMPRESSION: Heart size is normal. Lungs remain clear. No visible pneumothorax.

## 2021-11-26 NOTE — ED TRIAGE NOTES
Pt seen at this facility on 11/11 for headache, myalgias, nausea and vomiting. Pt states symptoms have persisted since being seen previously. Pt states has seen PCP for same without improvement. Pt states difficulty ambulating secondary to weakness/fatigue. ABCs intact GCS 15

## 2021-11-26 NOTE — ED NOTES
[facility]  ED Nurse Handoff Report    Akash James is a 36 year old male   ED Chief complaint: Flu Symptoms  . ED Diagnosis:   Final diagnoses:   Weakness generalized   Nonintractable headache, unspecified chronicity pattern, unspecified headache type     Allergies:   Allergies   Allergen Reactions     Dog Epithelium Itching and Shortness Of Breath     Other reaction(s): Cough     Uncaria Tomentosa (Cats Claw) Cough, Itching and Shortness Of Breath       Code Status: Full Code  Activity level - Baseline/Home:  Independent. Activity Level - Current:   Assist X 1. Lift room needed: No. Bariatric: No   Needed: No   Isolation: No. Infection: Not Applicable  Other .     Vital Signs:   Vitals:    11/26/21 0045 11/26/21 0303   BP: 105/71    Pulse: 94 90   Resp: 20 18   Temp: 98.3  F (36.8  C)    TempSrc: Oral    SpO2: 98% 99%       Cardiac Rhythm:  ,      Pain level:    Patient confused: No. Patient Falls Risk: Yes.   Elimination Status: Has voided   Patient Report - Initial Complaint: c/o headache, body aches, nausea/vomiting, fever, chills, anterior leg numbness. Focused Assessment: negative COVID, strep and flu 11/11, symptoms continued since that visit. Today pt is positive for strep.   Tests Performed:   Labs Ordered and Resulted from Time of ED Arrival to Time of ED Departure   COMPREHENSIVE METABOLIC PANEL - Abnormal       Result Value    Sodium 132 (*)     Potassium 3.9      Chloride 99      Carbon Dioxide (CO2) 30      Anion Gap 3      Urea Nitrogen 9      Creatinine 1.05      Calcium 9.0      Glucose 112 (*)     Alkaline Phosphatase 43      AST 12      ALT 15      Protein Total 7.4      Albumin 3.6      Bilirubin Total 0.5      GFR Estimate >90     ROUTINE UA WITH MICROSCOPIC - Abnormal    Color Urine Light Yellow      Appearance Urine Clear      Glucose Urine Negative      Bilirubin Urine Negative      Ketones Urine Trace (*)     Specific Gravity Urine 1.011      Blood Urine  Negative      pH Urine 6.0      Protein Albumin Urine Negative      Urobilinogen Urine 2.0      Nitrite Urine Negative      Leukocyte Esterase Urine Negative      Bacteria Urine Few (*)     Mucus Urine Present (*)     RBC Urine <1      WBC Urine 1     ERYTHROCYTE SEDIMENTATION RATE AUTO - Abnormal    Erythrocyte Sedimentation Rate 16 (*)    ISTAT GASES LACTATE VENOUS POCT - Abnormal    Lactic Acid POCT 0.6      Bicarbonate Venous POCT 30 (*)     O2 Sat, Venous POCT 78 (*)     pCO2V Venous POCT 49      pH Venous POCT 7.40      pO2 Venous POCT 43     PROTEIN TOTAL CSF - Abnormal    Protein total CSF 64 (*)    CELL COUNT CSF - Abnormal    Tube Number 1      Color Colorless      Clarity Clear      Total Nucleated Cells 32 (*)     RBC Count 109 (*)    STREPTOCOCCUS A RAPID SCREEN W REFELX TO PCR - Abnormal    Group A Strep antigen Positive (*)    INFLUENZA A/B & SARS-COV2 PCR MULTIPLEX - Normal    Influenza A PCR Negative      Influenza B PCR Negative      SARS CoV2 PCR Negative     CK TOTAL - Normal         CRP INFLAMMATION - Normal    CRP Inflammation <2.9     TROPONIN I - Normal    Troponin I High Sensitivity 6     GLUCOSE CSF - Normal    Glucose CSF 63     CBC WITH PLATELETS AND DIFFERENTIAL    WBC Count 4.0      RBC Count 4.75      Hemoglobin 14.8      Hematocrit 42.6      MCV 90      MCH 31.2      MCHC 34.7      RDW 11.9      Platelet Count 320      % Neutrophils 50      % Lymphocytes 32      % Monocytes 15      % Eosinophils 2      % Basophils 1      % Immature Granulocytes 0      NRBCs per 100 WBC 0      Absolute Neutrophils 2.0      Absolute Lymphocytes 1.3      Absolute Monocytes 0.6      Absolute Eosinophils 0.1      Absolute Basophils 0.0      Absolute Immature Granulocytes 0.0      Absolute NRBCs 0.0     DIFFERENTIAL CSF    % Neutrophils 26      % Lymphocytes 68      % Monocytes/Macrophages 6     LYME IGG AND IGM CSF IMMUNOBLOT   OLIGOCLONAL BANDING   OLIGOCLONAL BANDING   SERUM COLLECTION   BLOOD  CULTURE   BLOOD CULTURE   GRAM STAIN   AEROBIC BACTERIAL CULTURE ROUTINE   HERPES SIMPLEX VIRUS 1&2 PCR   VIRAL CULTURE NON-RESPIRATORY   CELL COUNT WITH DIFFERENTIAL CSF     XR Chest 2 Views   Final Result   IMPRESSION: Heart size is normal. Lungs remain clear. No visible pneumothorax.      Head CT w/o contrast   Final Result   IMPRESSION:   1.  No acute intracranial process.      MR Brain w/o & w Contrast    (Results Pending)   MR Cervical Spine w/o & w Contrast    (Results Pending)   MR Thoracic Spine w/o & w Contrast    (Results Pending)   Lumbar spine MRI w & w/o contrast - surgery <10yrs    (Results Pending)     . Abnormal Results: positive strep, see other results above.   Treatments provided: IV fluids, toradol, zofran, reglan given  Family Comments: none  OBS brochure/video discussed/provided to patient:  N/A  ED Medications:   Medications   sodium chloride (PF) 0.9% PF flush 3 mL (has no administration in time range)   sodium chloride (PF) 0.9% PF flush 3 mL (has no administration in time range)   acetaminophen (TYLENOL) tablet 975 mg (975 mg Oral Not Given 11/26/21 0245)   lidocaine 4 % injection 1 mL (1 mL Nasal Not Given 11/26/21 0300)   diphenhydrAMINE (BENADRYL) injection 25 mg (25 mg Intravenous Not Given 11/26/21 0300)   ondansetron (ZOFRAN) injection 4 mg (4 mg Intravenous Given 11/26/21 0201)   0.9% sodium chloride BOLUS (0 mLs Intravenous Stopped 11/26/21 0249)   ketorolac (TORADOL) injection 10 mg (10 mg Intravenous Given 11/26/21 0353)   metoclopramide (REGLAN) injection 10 mg (10 mg Intravenous Given 11/26/21 0504)     Drips infusing:  No  For the majority of the shift, the patient's behavior Green. Interventions performed were none.    Sepsis treatment initiated: No     Patient tested for COVID 19 prior to admission: YES    ED Nurse Name/Phone Number: Aysha Mckeon RN,   6:35 AM    RECEIVING UNIT ED HANDOFF REVIEW    Above ED Nurse Handoff Report was reviewed: Yes  Reviewed by: Linda HEARN  ANASTACIA Perry on November 26, 2021 at 2:36 PM

## 2021-11-26 NOTE — PLAN OF CARE
ROOM # 228    Living Situation (if not independent, order SW consult): Home on own  Facility name: N/A  : Girlfriend (# on whiteboard)    Activity level at baseline: Ind  Activity level on admit: A1 gaitbelt d/t weakness in legs      Patient registered to observation; given Patient Bill of Rights; given the opportunity to ask questions about observation status and their plan of care.  Patient has been oriented to the observation room, bathroom and call light is in place.    Discussed discharge goals and expectations with patient/family.

## 2021-11-26 NOTE — CONSULTS
"[facility]  Neurology Consultation         Daniela Saez MD    Akash James MRN# [id number]   YOB: 1985 Age: 36 year old      Date of Admission:  11/26/2021  Date of Consult: 11/26/2021               Requesting Physician:      Dr. Davis         Chief Complaint:   Headache and leg weakness         History of Present Illness:   Akash James is a 36 year old male with no significant past medical history who presented to ER on 11/25/2021 with concern for bilateral leg weakness/numbness in setting of new headache/fevers/body aches.  He describes for past 3 weeks, headache (10/10 and diffuse/constant pounding, with associated fatigue, nausea/vomitting/hiccups, body aches and fever.  Seen in Er on 11/11/2021 and discharged home after neg COVID.  He reports about 2 weeks ago with increased vision in right eye and then about 1. 5 weeks ago acute onset of numbness from waist down and bilateral leg weakness.  Denies any urination difficulty but ongoing constipation with last BM 2-3 days ago.  He has not been able to work due to symptoms and girlfriend brought him to ER for further eval. In ER LP done.  Strep test positive but no sore throat.  I was called and recommended MRI B/C/T.  MRI imaging positive to longitudinally extensive C spine lesion with enhancement and T spine enhancing lesion and enhancement in medulla. No other T2 changes noted.     He reports ongoing diffuse headache, body pain, and hx of symptoms as above.  Was not able to work or walk at home.  He was seen by [facility] In [place] and notes that a \"lens helped\" vision.              Past Medical History:     Patient Active Problem List   Diagnosis     Peyronie disease     Paranoid schizophrenia (H)     CARLY (obstructive sleep apnea)     Caries     Weakness generalized      Denies hx of schizophrenia to me and not on any meds prior to admit          Past Surgical History:   No past surgical history on " file.           Home Medications:     Prior to Admission medications    Medication Sig Last Dose Taking? Auth Provider   acetaminophen 500 MG CAPS Take 2 capsules by mouth every 8 hours as needed For aches, pain, fever Past Month at Unknown time Yes Dimas Romero MD   ergocalciferol (ERGOCALCIFEROL) 1.25 MG (67321 UT) capsule Take 50,000 Units by mouth once a week 11/22/2021 Yes Unknown, Entered By History   ibuprofen (ADVIL/MOTRIN) 600 MG tablet Take 1 tablet (600 mg) by mouth every 6 hours as needed for fever Take with food. Past Month at Unknown time Yes Dimas Romero MD            Current Medications:           methylPREDNISolone  1,000 mg Intravenous Q24H     sodium chloride (PF)  3 mL Intracatheter Q8H     acetaminophen **OR** acetaminophen, alum & mag hydroxide-simethicone, lidocaine 4%, lidocaine (buffered or not buffered), magnesium hydroxide, melatonin, ondansetron **OR** ondansetron, senna-docusate **OR** senna-docusate, sodium chloride (PF), sodium chloride (PF)         Allergies:     Allergies   Allergen Reactions     Dog Epithelium Itching and Shortness Of Breath     Other reaction(s): Cough     Uncaria Tomentosa (Cats Claw) Cough, Itching and Shortness Of Breath            Social History:     Works as auto mechanicm          Family History:   No family history on file.  Mom with carpal tunnel  Not aware of other autoimmune disease in family           Review of Systems:   The 10 point Review of Systems is negative other than noted in the HPI.             Physical Exam:    , Blood pressure 110/66, pulse 80, temperature 98.5  F (36.9  C), temperature source Oral, resp. rate 16, SpO2 97 %.  0 lbs 0 oz       Cardio - Heart Rate Reg, Distal pulses palpable  Resp - Breathing non labored    Neurological Exam:  General: Mental status -Alert and orientated, speech without dysarthria, language fluent with intact naming and repetition  Cranial Nerves- Fundoscopic exam with no disc pallor but limited.   Pupils equal round and reactive to light. Extraocular movements intact. No nystagmus. VA 20/20 bilateral with near card.   Face symmetric and intact to light touch, tongue midline, palate activates symmetrically. Shoulder shrug 5/5. Does have constant hiccups   Motor: Normal muscle bulk and tone.  Has 5/5 strength in bilateral uppers.  In legs R/L - hip flexion 4-/4- fatigues to <3/<3, knee extension 4+4+ but fatigues to <3/<3, ADF 3/4- but fatigues to <3   Sensation: intact to light touch and pinprick and vibration throughout uppers,  In legs vibration intact at knees but absent below, decreased light touch in legs.  No clear pinprick level.  Can feel in legs but decreased compared to arms   Reflexes:  Biceps 2/2, brachioradialis 2/2, patella 3/3, ankles 3/3, toes upgoing  Cerebellar: intact FNF  Gait: in bed.          Data:   All new lab and imaging data was reviewed.  Recent Labs   Lab 11/26/21 0148 11/26/21 0146   WBC  --  4.0   HGB  --  14.8   MCV  --  90   PLT  --  320   *  --    POTASSIUM 3.9  --    CHLORIDE 99  --    CO2 30  --    BUN 9  --    CR 1.05  --    ANIONGAP 3  --    SHAYNA 9.0  --    *  --    ALBUMIN 3.6  --    PROTTOTAL 7.4  --    BILITOTAL 0.5  --    ALKPHOS 43  --    ALT 15  --    AST 12  --        CSF - WBC 32, 68% lymph, , P 64 ,Glucose 63     B12 - 625, TSH 2.12, CRP <2.9, ESR 16  CK wnl        ..  Recent Results (from the past 24 hour(s))   Head CT w/o contrast    Narrative    EXAM: CT HEAD W/O CONTRAST  LOCATION: [facility]  DATE/TIME: 11/26/2021 2:23 AM    INDICATION: Headache, intracranial hemorrhage suspected  COMPARISON: 09/12/2008  TECHNIQUE: Routine CT Head without IV contrast. Multiplanar reformats. Dose reduction techniques were used.    FINDINGS:  INTRACRANIAL CONTENTS: No intracranial hemorrhage, extraaxial collection, or mass effect.  No CT evidence of acute infarct. Normal parenchymal attenuation. Normal ventricles and sulci.      VISUALIZED ORBITS/SINUSES/MASTOIDS: No intraorbital abnormality. No paranasal sinus mucosal disease. No middle ear or mastoid effusion.    BONES/SOFT TISSUES: No acute abnormality.      Impression    IMPRESSION:  1.  No acute intracranial process.   XR Chest 2 Views    Narrative    EXAM: XR CHEST 2 VW  LOCATION: [facility]  DATE/TIME: 11/26/2021 2:31 AM    INDICATION: Fever  COMPARISON: 11/11/2021      Impression    IMPRESSION: Heart size is normal. Lungs remain clear. No visible pneumothorax.   MR Brain w/o & w Contrast    Narrative    MR BRAIN WITHOUT AND WITH CONTRAST   MR CERVICAL SPINE WITHOUT AND WITH CONTRAST   MR LUMBAR SPINE WITHOUT AND WITH CONTRAST   MR THORACIC SPINE WITHOUT AND WITH CONTRAST  11/26/2021 10:52 AM    INDICATION: Multiple sclerosis, new event. Neurological deficit,  acute, stroke suspected; headache, fever, leg weakness.    TECHNIQUE:   1. Brain MRI with and without IV contrast.  2. Cervical spine MRI with and without IV contrast.  3. Thoracic spine MRI without and with IV contrast.  4. Lumbar spine MRI without and with IV contrast.    CONTRAST: 8 mL Gadavist  COMPARISON: 9/12/2008 brain MRI.    FINDINGS:    HEAD MRI:  No finding for acute infarct, intracranial hemorrhage, or extra-axial  collection. Interval development of a T2/FLAIR hyperintensity in the  ventral right medulla oblongata with a small focus of left lateral  involvement and dorsal involvement extending into the inferior  cerebellar peduncles bilaterally. This is accompanied by a small  amount of T2 hyperintensity in the medial cerebellar hemispheres  bilaterally with involvement of the lateral margins of the fourth  ventricle. Additional punctate focus of T2 hyperintensity along the  ventral margin of the fourth ventricle, seen to the left of midline.  This is accompanied by a small amount of parenchymal enhancement along  the ventral left lateral margin of the fourth ventricle and the  medial  aspect of the bilateral cerebellar hemispheres, with parenchymal and  leptomeningeal enhancement at the medulla extending to the  cervicomedullary junction.    No additional parenchymal signal abnormality elsewhere. Normal  ventricular size and configuration with no parenchymal volume loss.  Major intracranial vascular flow voids are preserved. Cerebellar  tonsils are normally positioned.    Trace mucosal thickening in the ethmoid air cells. No fluid in the  mastoid air cells. Unremarkable orbits. Remainder negative.    Cervical spine MRI:  Straightening of the usual cervical lordosis with otherwise  unremarkable alignment. Maintained vertebral body heights.  Unremarkable marrow signal. No osseous edema. No extraspinal  abnormality. Disc space heights are preserved at all levels. Small  posterior disc osteophyte complex at C5-C6 with no significant  posterior disc osteophyte complex elsewhere. No significant facet  arthropathy. Mild C5-C6 spinal canal stenosis. No significant  neuroforaminal stenosis at any level. Expansile T2 hyperintense cord  signal abnormality extending from the C4 level to the C6-C7 level,  with a rounded focus of enhancement at the C4 level measuring 8 mm  craniocaudal and 4 mm AP, and a rounded focus of enhancement at the  C5-C6 level measuring 13 mm cranial caudal and 8 mm AP.    Thoracic spine MRI:  Unremarkable alignment. Maintained vertebral body heights.  Unremarkable marrow signal. No osseous edema. No abnormal enhancement.  Disc heights are preserved at all levels. Disc desiccation at the  T10-T11 level with only mild loss of disc signal. No posterior disc  bulge. No significant facet arthropathy. No significant spinal canal  or neuroforaminal stenosis at any level. T2/STIR hyperintense cord  signal change at the T10 level measures up to 2.7 cm craniocaudal,  with an enhancing focus that measures 1.9 cm craniocaudal. No  additional cord signal abnormality elsewhere. No  additional foci of  abnormal enhancement. Remainder negative.    Lumbar spine MRI: Numbering shows 5 lumbar type vertebrae.  Unremarkable alignment. Maintained vertebral body heights.  Unremarkable marrow signal with no osseous edema or concerning marrow  signal abnormality. No significant degenerative change in the lumbar  spine with no high-grade spinal canal or neuroforaminal stenosis at  any level. Conus tip is at the L1-L2 level. No abnormal enhancement or  thickening of the cauda equina nerve roots. Visualized bony pelvis is  unremarkable. Remainder negative.      Impression    IMPRESSION:   Head MRI:  1.  Interval development of a T2/FLAIR hyperintensity involving the  medulla, the medial aspect of the cerebellar hemispheres, and the  dorsal dhaval along the anterior margin of the fourth ventricle just to  the left of midline. There is an accompanying parenchymal enhancement  in these areas, with suggestion of a small amount of leptomeningeal  enhancement at the cervicomedullary junction. Given the submitted  clinical history, this raises concern for active demyelination in the  setting of ADEM or multiple sclerosis. Anti-MOG antibody disease could  generate a similar appearance. Given the possibility of a small amount  of leptomeningeal enhancement, consideration could be given towards  meningitis or possible sarcoid in the appropriate clinical setting.    Cervical spine MRI:  1.  Expansile T2 hyperintense cord signal abnormality extending from  the inferior C4 to the superior C7 level with rounded foci of  accompanying enhancement at the C4 and C5-C6 levels. This raises  concern for transverse myelitis, with the overall clinical picture  suggesting the possibility of ADEM (or possible Anti-MOG antibody  disease) with cord involvement. Active demyelination in the setting of  multiple sclerosis should also be considered. Infectious transverse  myelitis is thought less likely but provides additional  consideration.    Thoracic spine MRI:  1.  Enhancing T2/STIR hyperintense cord lesion at the T10 level  measuring up to 2.7 cm craniocaudal. No additional cord signal  abnormality or abnormal cord enhancement elsewhere. Differential  considerations as outlined in the cervical spine MRI impression still  apply.    Lumbar spine MRI:  1.  No abnormal enhancement in the cauda equina nerve roots or  visualized cord signal abnormality in the visualized inferior aspect  of the spinal cord.    2.  No significant degenerative change in the lumbar spine. Spinal  canal and neuroforamina are widely patent.    JESSI GRAVES MD         SYSTEM ID:  [id number]   MR Cervical Spine w/o & w Contrast    Narrative    MR BRAIN WITHOUT AND WITH CONTRAST   MR CERVICAL SPINE WITHOUT AND WITH CONTRAST   MR LUMBAR SPINE WITHOUT AND WITH CONTRAST   MR THORACIC SPINE WITHOUT AND WITH CONTRAST  11/26/2021 10:52 AM    INDICATION: Multiple sclerosis, new event. Neurological deficit,  acute, stroke suspected; headache, fever, leg weakness.    TECHNIQUE:   1. Brain MRI with and without IV contrast.  2. Cervical spine MRI with and without IV contrast.  3. Thoracic spine MRI without and with IV contrast.  4. Lumbar spine MRI without and with IV contrast.    CONTRAST: 8 mL Gadavist  COMPARISON: 9/12/2008 brain MRI.    FINDINGS:    HEAD MRI:  No finding for acute infarct, intracranial hemorrhage, or extra-axial  collection. Interval development of a T2/FLAIR hyperintensity in the  ventral right medulla oblongata with a small focus of left lateral  involvement and dorsal involvement extending into the inferior  cerebellar peduncles bilaterally. This is accompanied by a small  amount of T2 hyperintensity in the medial cerebellar hemispheres  bilaterally with involvement of the lateral margins of the fourth  ventricle. Additional punctate focus of T2 hyperintensity along the  ventral margin of the fourth ventricle, seen to the left of midline.  This is  accompanied by a small amount of parenchymal enhancement along  the ventral left lateral margin of the fourth ventricle and the medial  aspect of the bilateral cerebellar hemispheres, with parenchymal and  leptomeningeal enhancement at the medulla extending to the  cervicomedullary junction.    No additional parenchymal signal abnormality elsewhere. Normal  ventricular size and configuration with no parenchymal volume loss.  Major intracranial vascular flow voids are preserved. Cerebellar  tonsils are normally positioned.    Trace mucosal thickening in the ethmoid air cells. No fluid in the  mastoid air cells. Unremarkable orbits. Remainder negative.    Cervical spine MRI:  Straightening of the usual cervical lordosis with otherwise  unremarkable alignment. Maintained vertebral body heights.  Unremarkable marrow signal. No osseous edema. No extraspinal  abnormality. Disc space heights are preserved at all levels. Small  posterior disc osteophyte complex at C5-C6 with no significant  posterior disc osteophyte complex elsewhere. No significant facet  arthropathy. Mild C5-C6 spinal canal stenosis. No significant  neuroforaminal stenosis at any level. Expansile T2 hyperintense cord  signal abnormality extending from the C4 level to the C6-C7 level,  with a rounded focus of enhancement at the C4 level measuring 8 mm  craniocaudal and 4 mm AP, and a rounded focus of enhancement at the  C5-C6 level measuring 13 mm cranial caudal and 8 mm AP.    Thoracic spine MRI:  Unremarkable alignment. Maintained vertebral body heights.  Unremarkable marrow signal. No osseous edema. No abnormal enhancement.  Disc heights are preserved at all levels. Disc desiccation at the  T10-T11 level with only mild loss of disc signal. No posterior disc  bulge. No significant facet arthropathy. No significant spinal canal  or neuroforaminal stenosis at any level. T2/STIR hyperintense cord  signal change at the T10 level measures up to 2.7 cm  craniocaudal,  with an enhancing focus that measures 1.9 cm craniocaudal. No  additional cord signal abnormality elsewhere. No additional foci of  abnormal enhancement. Remainder negative.    Lumbar spine MRI: Numbering shows 5 lumbar type vertebrae.  Unremarkable alignment. Maintained vertebral body heights.  Unremarkable marrow signal with no osseous edema or concerning marrow  signal abnormality. No significant degenerative change in the lumbar  spine with no high-grade spinal canal or neuroforaminal stenosis at  any level. Conus tip is at the L1-L2 level. No abnormal enhancement or  thickening of the cauda equina nerve roots. Visualized bony pelvis is  unremarkable. Remainder negative.      Impression    IMPRESSION:   Head MRI:  1.  Interval development of a T2/FLAIR hyperintensity involving the  medulla, the medial aspect of the cerebellar hemispheres, and the  dorsal dhaval along the anterior margin of the fourth ventricle just to  the left of midline. There is an accompanying parenchymal enhancement  in these areas, with suggestion of a small amount of leptomeningeal  enhancement at the cervicomedullary junction. Given the submitted  clinical history, this raises concern for active demyelination in the  setting of ADEM or multiple sclerosis. Anti-MOG antibody disease could  generate a similar appearance. Given the possibility of a small amount  of leptomeningeal enhancement, consideration could be given towards  meningitis or possible sarcoid in the appropriate clinical setting.    Cervical spine MRI:  1.  Expansile T2 hyperintense cord signal abnormality extending from  the inferior C4 to the superior C7 level with rounded foci of  accompanying enhancement at the C4 and C5-C6 levels. This raises  concern for transverse myelitis, with the overall clinical picture  suggesting the possibility of ADEM (or possible Anti-MOG antibody  disease) with cord involvement. Active demyelination in the setting of  multiple  sclerosis should also be considered. Infectious transverse  myelitis is thought less likely but provides additional consideration.    Thoracic spine MRI:  1.  Enhancing T2/STIR hyperintense cord lesion at the T10 level  measuring up to 2.7 cm craniocaudal. No additional cord signal  abnormality or abnormal cord enhancement elsewhere. Differential  considerations as outlined in the cervical spine MRI impression still  apply.    Lumbar spine MRI:  1.  No abnormal enhancement in the cauda equina nerve roots or  visualized cord signal abnormality in the visualized inferior aspect  of the spinal cord.    2.  No significant degenerative change in the lumbar spine. Spinal  canal and neuroforamina are widely patent.    JESSI GRAVES MD         SYSTEM ID:  [id number]   MR Thoracic Spine w/o & w Contrast    Narrative    MR BRAIN WITHOUT AND WITH CONTRAST   MR CERVICAL SPINE WITHOUT AND WITH CONTRAST   MR LUMBAR SPINE WITHOUT AND WITH CONTRAST   MR THORACIC SPINE WITHOUT AND WITH CONTRAST  11/26/2021 10:52 AM    INDICATION: Multiple sclerosis, new event. Neurological deficit,  acute, stroke suspected; headache, fever, leg weakness.    TECHNIQUE:   1. Brain MRI with and without IV contrast.  2. Cervical spine MRI with and without IV contrast.  3. Thoracic spine MRI without and with IV contrast.  4. Lumbar spine MRI without and with IV contrast.    CONTRAST: 8 mL Gadavist  COMPARISON: 9/12/2008 brain MRI.    FINDINGS:    HEAD MRI:  No finding for acute infarct, intracranial hemorrhage, or extra-axial  collection. Interval development of a T2/FLAIR hyperintensity in the  ventral right medulla oblongata with a small focus of left lateral  involvement and dorsal involvement extending into the inferior  cerebellar peduncles bilaterally. This is accompanied by a small  amount of T2 hyperintensity in the medial cerebellar hemispheres  bilaterally with involvement of the lateral margins of the fourth  ventricle. Additional punctate  focus of T2 hyperintensity along the  ventral margin of the fourth ventricle, seen to the left of midline.  This is accompanied by a small amount of parenchymal enhancement along  the ventral left lateral margin of the fourth ventricle and the medial  aspect of the bilateral cerebellar hemispheres, with parenchymal and  leptomeningeal enhancement at the medulla extending to the  cervicomedullary junction.    No additional parenchymal signal abnormality elsewhere. Normal  ventricular size and configuration with no parenchymal volume loss.  Major intracranial vascular flow voids are preserved. Cerebellar  tonsils are normally positioned.    Trace mucosal thickening in the ethmoid air cells. No fluid in the  mastoid air cells. Unremarkable orbits. Remainder negative.    Cervical spine MRI:  Straightening of the usual cervical lordosis with otherwise  unremarkable alignment. Maintained vertebral body heights.  Unremarkable marrow signal. No osseous edema. No extraspinal  abnormality. Disc space heights are preserved at all levels. Small  posterior disc osteophyte complex at C5-C6 with no significant  posterior disc osteophyte complex elsewhere. No significant facet  arthropathy. Mild C5-C6 spinal canal stenosis. No significant  neuroforaminal stenosis at any level. Expansile T2 hyperintense cord  signal abnormality extending from the C4 level to the C6-C7 level,  with a rounded focus of enhancement at the C4 level measuring 8 mm  craniocaudal and 4 mm AP, and a rounded focus of enhancement at the  C5-C6 level measuring 13 mm cranial caudal and 8 mm AP.    Thoracic spine MRI:  Unremarkable alignment. Maintained vertebral body heights.  Unremarkable marrow signal. No osseous edema. No abnormal enhancement.  Disc heights are preserved at all levels. Disc desiccation at the  T10-T11 level with only mild loss of disc signal. No posterior disc  bulge. No significant facet arthropathy. No significant spinal canal  or  neuroforaminal stenosis at any level. T2/STIR hyperintense cord  signal change at the T10 level measures up to 2.7 cm craniocaudal,  with an enhancing focus that measures 1.9 cm craniocaudal. No  additional cord signal abnormality elsewhere. No additional foci of  abnormal enhancement. Remainder negative.    Lumbar spine MRI: Numbering shows 5 lumbar type vertebrae.  Unremarkable alignment. Maintained vertebral body heights.  Unremarkable marrow signal with no osseous edema or concerning marrow  signal abnormality. No significant degenerative change in the lumbar  spine with no high-grade spinal canal or neuroforaminal stenosis at  any level. Conus tip is at the L1-L2 level. No abnormal enhancement or  thickening of the cauda equina nerve roots. Visualized bony pelvis is  unremarkable. Remainder negative.      Impression    IMPRESSION:   Head MRI:  1.  Interval development of a T2/FLAIR hyperintensity involving the  medulla, the medial aspect of the cerebellar hemispheres, and the  dorsal dhaval along the anterior margin of the fourth ventricle just to  the left of midline. There is an accompanying parenchymal enhancement  in these areas, with suggestion of a small amount of leptomeningeal  enhancement at the cervicomedullary junction. Given the submitted  clinical history, this raises concern for active demyelination in the  setting of ADEM or multiple sclerosis. Anti-MOG antibody disease could  generate a similar appearance. Given the possibility of a small amount  of leptomeningeal enhancement, consideration could be given towards  meningitis or possible sarcoid in the appropriate clinical setting.    Cervical spine MRI:  1.  Expansile T2 hyperintense cord signal abnormality extending from  the inferior C4 to the superior C7 level with rounded foci of  accompanying enhancement at the C4 and C5-C6 levels. This raises  concern for transverse myelitis, with the overall clinical picture  suggesting the possibility of  ADEM (or possible Anti-MOG antibody  disease) with cord involvement. Active demyelination in the setting of  multiple sclerosis should also be considered. Infectious transverse  myelitis is thought less likely but provides additional consideration.    Thoracic spine MRI:  1.  Enhancing T2/STIR hyperintense cord lesion at the T10 level  measuring up to 2.7 cm craniocaudal. No additional cord signal  abnormality or abnormal cord enhancement elsewhere. Differential  considerations as outlined in the cervical spine MRI impression still  apply.    Lumbar spine MRI:  1.  No abnormal enhancement in the cauda equina nerve roots or  visualized cord signal abnormality in the visualized inferior aspect  of the spinal cord.    2.  No significant degenerative change in the lumbar spine. Spinal  canal and neuroforamina are widely patent.    JESSI GRAVES MD         SYSTEM ID:  [id number]   Lumbar spine MRI w & w/o contrast - surgery <10yrs    Narrative    MR BRAIN WITHOUT AND WITH CONTRAST   MR CERVICAL SPINE WITHOUT AND WITH CONTRAST   MR LUMBAR SPINE WITHOUT AND WITH CONTRAST   MR THORACIC SPINE WITHOUT AND WITH CONTRAST  11/26/2021 10:52 AM    INDICATION: Multiple sclerosis, new event. Neurological deficit,  acute, stroke suspected; headache, fever, leg weakness.    TECHNIQUE:   1. Brain MRI with and without IV contrast.  2. Cervical spine MRI with and without IV contrast.  3. Thoracic spine MRI without and with IV contrast.  4. Lumbar spine MRI without and with IV contrast.    CONTRAST: 8 mL Gadavist  COMPARISON: 9/12/2008 brain MRI.    FINDINGS:    HEAD MRI:  No finding for acute infarct, intracranial hemorrhage, or extra-axial  collection. Interval development of a T2/FLAIR hyperintensity in the  ventral right medulla oblongata with a small focus of left lateral  involvement and dorsal involvement extending into the inferior  cerebellar peduncles bilaterally. This is accompanied by a small  amount of T2 hyperintensity in  the medial cerebellar hemispheres  bilaterally with involvement of the lateral margins of the fourth  ventricle. Additional punctate focus of T2 hyperintensity along the  ventral margin of the fourth ventricle, seen to the left of midline.  This is accompanied by a small amount of parenchymal enhancement along  the ventral left lateral margin of the fourth ventricle and the medial  aspect of the bilateral cerebellar hemispheres, with parenchymal and  leptomeningeal enhancement at the medulla extending to the  cervicomedullary junction.    No additional parenchymal signal abnormality elsewhere. Normal  ventricular size and configuration with no parenchymal volume loss.  Major intracranial vascular flow voids are preserved. Cerebellar  tonsils are normally positioned.    Trace mucosal thickening in the ethmoid air cells. No fluid in the  mastoid air cells. Unremarkable orbits. Remainder negative.    Cervical spine MRI:  Straightening of the usual cervical lordosis with otherwise  unremarkable alignment. Maintained vertebral body heights.  Unremarkable marrow signal. No osseous edema. No extraspinal  abnormality. Disc space heights are preserved at all levels. Small  posterior disc osteophyte complex at C5-C6 with no significant  posterior disc osteophyte complex elsewhere. No significant facet  arthropathy. Mild C5-C6 spinal canal stenosis. No significant  neuroforaminal stenosis at any level. Expansile T2 hyperintense cord  signal abnormality extending from the C4 level to the C6-C7 level,  with a rounded focus of enhancement at the C4 level measuring 8 mm  craniocaudal and 4 mm AP, and a rounded focus of enhancement at the  C5-C6 level measuring 13 mm cranial caudal and 8 mm AP.    Thoracic spine MRI:  Unremarkable alignment. Maintained vertebral body heights.  Unremarkable marrow signal. No osseous edema. No abnormal enhancement.  Disc heights are preserved at all levels. Disc desiccation at the  T10-T11 level with  only mild loss of disc signal. No posterior disc  bulge. No significant facet arthropathy. No significant spinal canal  or neuroforaminal stenosis at any level. T2/STIR hyperintense cord  signal change at the T10 level measures up to 2.7 cm craniocaudal,  with an enhancing focus that measures 1.9 cm craniocaudal. No  additional cord signal abnormality elsewhere. No additional foci of  abnormal enhancement. Remainder negative.    Lumbar spine MRI: Numbering shows 5 lumbar type vertebrae.  Unremarkable alignment. Maintained vertebral body heights.  Unremarkable marrow signal with no osseous edema or concerning marrow  signal abnormality. No significant degenerative change in the lumbar  spine with no high-grade spinal canal or neuroforaminal stenosis at  any level. Conus tip is at the L1-L2 level. No abnormal enhancement or  thickening of the cauda equina nerve roots. Visualized bony pelvis is  unremarkable. Remainder negative.      Impression    IMPRESSION:   Head MRI:  1.  Interval development of a T2/FLAIR hyperintensity involving the  medulla, the medial aspect of the cerebellar hemispheres, and the  dorsal dhaval along the anterior margin of the fourth ventricle just to  the left of midline. There is an accompanying parenchymal enhancement  in these areas, with suggestion of a small amount of leptomeningeal  enhancement at the cervicomedullary junction. Given the submitted  clinical history, this raises concern for active demyelination in the  setting of ADEM or multiple sclerosis. Anti-MOG antibody disease could  generate a similar appearance. Given the possibility of a small amount  of leptomeningeal enhancement, consideration could be given towards  meningitis or possible sarcoid in the appropriate clinical setting.    Cervical spine MRI:  1.  Expansile T2 hyperintense cord signal abnormality extending from  the inferior C4 to the superior C7 level with rounded foci of  accompanying enhancement at the C4 and  C5-C6 levels. This raises  concern for transverse myelitis, with the overall clinical picture  suggesting the possibility of ADEM (or possible Anti-MOG antibody  disease) with cord involvement. Active demyelination in the setting of  multiple sclerosis should also be considered. Infectious transverse  myelitis is thought less likely but provides additional consideration.    Thoracic spine MRI:  1.  Enhancing T2/STIR hyperintense cord lesion at the T10 level  measuring up to 2.7 cm craniocaudal. No additional cord signal  abnormality or abnormal cord enhancement elsewhere. Differential  considerations as outlined in the cervical spine MRI impression still  apply.    Lumbar spine MRI:  1.  No abnormal enhancement in the cauda equina nerve roots or  visualized cord signal abnormality in the visualized inferior aspect  of the spinal cord.    2.  No significant degenerative change in the lumbar spine. Spinal  canal and neuroforamina are widely patent.    JESSI GRAVES MD         SYSTEM ID:  [id number]              Assessment and Plan:       Mr. James is 36 year old male with history notes indicating hx of schizophrenia but denies diagnosis today who reports 3 week history of hiccups/nausea/vomitting/fever/body aches then with development of changes in vision in right eye and bilateral leg weakness/numbness from waist down.  Now with MRIs with acute enhancing lesions seen in medulla (consistent with area postrema syndrome), longitudinally extensive cervical cord lesion and thoracic lesion.  Exam with bilateral leg weakness/numbness.  Right eye vision 20/20 with near card.  Concern for ADEM vs NMOSD vs other demyelinating/autoimmune pathology.  Fevers body/aches typically not seen with demyelination but ADEM often seen secondary to viral/infectious cause.     Complete screening work up with serum labs - NMO/MOG - (I wrote for send out to [place] as FACS sample more sensitive than test done with [facility]), RPR, Lyme, HIV,  ACE,  CAMACHO, SSA/SSB, ANti DS antibody, TSH, B12, Vit D     CSF - Oligoclonal bands and IgG index sent through ER, Lyme was also sent through ER - no further CSF available per lab.  For now no need to repeat tap.  Consider in future pending further lab work up     Vision changes - I ordered MRI orbit as if optic neuritis found more concern for NMO vs ADEM    Treatment - Start IV SoluMedrol 1000mg x 5 days, while on high dose steroids monitor glucose, BPs, start PPI oral for stomach protection.  Pending response consider IVIG after steroid completed  Start PT/OT     Symptoms  Weakness/numbness - PT/OT  Hiccups - area postrema syndrome - hopefully will improve with steroids consider anti-nausea as needed  Headache - possible steroids will improve   Bladder/Bowel - monitor for neurogenic bladder/ reports constipation - PRN laxative - consider miralax if needed      Other  Strep - per primary team -   CARLY - per primary team     Neuro will continue to follow     81 min spent in coordination of care, review or records, visit with patient

## 2021-11-27 ENCOUNTER — APPOINTMENT (OUTPATIENT)
Dept: OCCUPATIONAL THERAPY | Facility: CLINIC | Age: 36
End: 2021-11-27
Attending: INTERNAL MEDICINE
Payer: COMMERCIAL

## 2021-11-27 ENCOUNTER — APPOINTMENT (OUTPATIENT)
Dept: MRI IMAGING | Facility: CLINIC | Age: 36
End: 2021-11-27
Attending: PSYCHIATRY & NEUROLOGY
Payer: COMMERCIAL

## 2021-11-27 ENCOUNTER — APPOINTMENT (OUTPATIENT)
Dept: PHYSICAL THERAPY | Facility: CLINIC | Age: 36
End: 2021-11-27
Attending: INTERNAL MEDICINE
Payer: COMMERCIAL

## 2021-11-27 LAB
ANION GAP SERPL CALCULATED.3IONS-SCNC: 5 MMOL/L (ref 3–14)
B BURGDOR IGG+IGM SER QL: 0.15
BUN SERPL-MCNC: 11 MG/DL (ref 7–30)
CALCIUM SERPL-MCNC: 9.3 MG/DL (ref 8.5–10.1)
CHLORIDE BLD-SCNC: 104 MMOL/L (ref 94–109)
CO2 SERPL-SCNC: 27 MMOL/L (ref 20–32)
CREAT SERPL-MCNC: 0.93 MG/DL (ref 0.66–1.25)
DEPRECATED CALCIDIOL+CALCIFEROL SERPL-MC: 12 UG/L (ref 20–75)
ERYTHROCYTE [DISTWIDTH] IN BLOOD BY AUTOMATED COUNT: 11.8 % (ref 10–15)
GFR SERPL CREATININE-BSD FRML MDRD: >90 ML/MIN/1.73M2
GLUCOSE BLD-MCNC: 126 MG/DL (ref 70–99)
GLUCOSE BLDC GLUCOMTR-MCNC: 157 MG/DL (ref 70–99)
GLUCOSE BLDC GLUCOMTR-MCNC: 97 MG/DL (ref 70–99)
HBA1C MFR BLD: 5.4 % (ref 0–5.6)
HCT VFR BLD AUTO: 42.1 % (ref 40–53)
HGB BLD-MCNC: 14.4 G/DL (ref 13.3–17.7)
HIV 1+2 AB+HIV1 P24 AG SERPL QL IA: NONREACTIVE
MCH RBC QN AUTO: 31.1 PG (ref 26.5–33)
MCHC RBC AUTO-ENTMCNC: 34.2 G/DL (ref 31.5–36.5)
MCV RBC AUTO: 91 FL (ref 78–100)
PLATELET # BLD AUTO: 315 10E3/UL (ref 150–450)
POTASSIUM BLD-SCNC: 4.1 MMOL/L (ref 3.4–5.3)
RBC # BLD AUTO: 4.63 10E6/UL (ref 4.4–5.9)
SODIUM SERPL-SCNC: 136 MMOL/L (ref 133–144)
T PALLIDUM AB SER QL: NONREACTIVE
WBC # BLD AUTO: 4.2 10E3/UL (ref 4–11)

## 2021-11-27 PROCEDURE — 36415 COLL VENOUS BLD VENIPUNCTURE: CPT | Performed by: PHYSICIAN ASSISTANT

## 2021-11-27 PROCEDURE — 120N000004 HC R&B MS OVERFLOW

## 2021-11-27 PROCEDURE — 250N000011 HC RX IP 250 OP 636: Performed by: INTERNAL MEDICINE

## 2021-11-27 PROCEDURE — 83036 HEMOGLOBIN GLYCOSYLATED A1C: CPT | Performed by: PHYSICIAN ASSISTANT

## 2021-11-27 PROCEDURE — 85027 COMPLETE CBC AUTOMATED: CPT | Performed by: INTERNAL MEDICINE

## 2021-11-27 PROCEDURE — 250N000013 HC RX MED GY IP 250 OP 250 PS 637: Performed by: PHYSICIAN ASSISTANT

## 2021-11-27 PROCEDURE — 258N000003 HC RX IP 258 OP 636: Performed by: INTERNAL MEDICINE

## 2021-11-27 PROCEDURE — 36415 COLL VENOUS BLD VENIPUNCTURE: CPT | Performed by: INTERNAL MEDICINE

## 2021-11-27 PROCEDURE — 97535 SELF CARE MNGMENT TRAINING: CPT | Mod: GO

## 2021-11-27 PROCEDURE — 97116 GAIT TRAINING THERAPY: CPT | Mod: GP | Performed by: PHYSICAL THERAPIST

## 2021-11-27 PROCEDURE — 70543 MRI ORBT/FAC/NCK W/O &W/DYE: CPT

## 2021-11-27 PROCEDURE — 97166 OT EVAL MOD COMPLEX 45 MIN: CPT | Mod: GO

## 2021-11-27 PROCEDURE — 250N000013 HC RX MED GY IP 250 OP 250 PS 637: Performed by: INTERNAL MEDICINE

## 2021-11-27 PROCEDURE — 80048 BASIC METABOLIC PNL TOTAL CA: CPT | Performed by: INTERNAL MEDICINE

## 2021-11-27 PROCEDURE — 255N000002 HC RX 255 OP 636: Performed by: INTERNAL MEDICINE

## 2021-11-27 PROCEDURE — 97162 PT EVAL MOD COMPLEX 30 MIN: CPT | Mod: GP | Performed by: PHYSICAL THERAPIST

## 2021-11-27 PROCEDURE — 97530 THERAPEUTIC ACTIVITIES: CPT | Mod: GP | Performed by: PHYSICAL THERAPIST

## 2021-11-27 PROCEDURE — 99232 SBSQ HOSP IP/OBS MODERATE 35: CPT | Performed by: PHYSICIAN ASSISTANT

## 2021-11-27 PROCEDURE — A9585 GADOBUTROL INJECTION: HCPCS | Performed by: INTERNAL MEDICINE

## 2021-11-27 PROCEDURE — 86038 ANTINUCLEAR ANTIBODIES: CPT | Performed by: PHYSICIAN ASSISTANT

## 2021-11-27 RX ORDER — DEXTROSE MONOHYDRATE 25 G/50ML
25-50 INJECTION, SOLUTION INTRAVENOUS
Status: DISCONTINUED | OUTPATIENT
Start: 2021-11-27 | End: 2021-11-30 | Stop reason: HOSPADM

## 2021-11-27 RX ORDER — AMOXICILLIN 250 MG
1 CAPSULE ORAL 2 TIMES DAILY
Status: DISCONTINUED | OUTPATIENT
Start: 2021-11-27 | End: 2021-11-30 | Stop reason: HOSPADM

## 2021-11-27 RX ORDER — POLYETHYLENE GLYCOL 3350 17 G/17G
17 POWDER, FOR SOLUTION ORAL DAILY PRN
Status: DISCONTINUED | OUTPATIENT
Start: 2021-11-27 | End: 2021-11-30 | Stop reason: HOSPADM

## 2021-11-27 RX ORDER — LANOLIN ALCOHOL/MO/W.PET/CERES
1000 CREAM (GRAM) TOPICAL DAILY
Status: DISCONTINUED | OUTPATIENT
Start: 2021-11-28 | End: 2021-11-30 | Stop reason: HOSPADM

## 2021-11-27 RX ORDER — GADOBUTROL 604.72 MG/ML
7.5 INJECTION INTRAVENOUS ONCE
Status: COMPLETED | OUTPATIENT
Start: 2021-11-27 | End: 2021-11-27

## 2021-11-27 RX ORDER — PANTOPRAZOLE SODIUM 40 MG/1
40 TABLET, DELAYED RELEASE ORAL
Status: DISCONTINUED | OUTPATIENT
Start: 2021-11-27 | End: 2021-11-30 | Stop reason: HOSPADM

## 2021-11-27 RX ORDER — NICOTINE POLACRILEX 4 MG
15-30 LOZENGE BUCCAL
Status: DISCONTINUED | OUTPATIENT
Start: 2021-11-27 | End: 2021-11-30 | Stop reason: HOSPADM

## 2021-11-27 RX ADMIN — SODIUM CHLORIDE 1000 MG: 9 INJECTION, SOLUTION INTRAVENOUS at 16:02

## 2021-11-27 RX ADMIN — GADOBUTROL 7.5 ML: 604.72 INJECTION INTRAVENOUS at 12:11

## 2021-11-27 RX ADMIN — BISACODYL 10 MG: 10 SUPPOSITORY RECTAL at 16:02

## 2021-11-27 RX ADMIN — SENNOSIDES AND DOCUSATE SODIUM 1 TABLET: 50; 8.6 TABLET ORAL at 13:13

## 2021-11-27 ASSESSMENT — ACTIVITIES OF DAILY LIVING (ADL)
ADLS_ACUITY_SCORE: 8
ADLS_ACUITY_SCORE: 9
ADLS_ACUITY_SCORE: 9
ADLS_ACUITY_SCORE: 8
ADLS_ACUITY_SCORE: 6
ADLS_ACUITY_SCORE: 9
ADLS_ACUITY_SCORE: 8
ADLS_ACUITY_SCORE: 6
ADLS_ACUITY_SCORE: 6
ADLS_ACUITY_SCORE: 9
ADLS_ACUITY_SCORE: 8
ADLS_ACUITY_SCORE: 9
ADLS_ACUITY_SCORE: 6
ADLS_ACUITY_SCORE: 9
ADLS_ACUITY_SCORE: 8
ADLS_ACUITY_SCORE: 8
ADLS_ACUITY_SCORE: 6
ADLS_ACUITY_SCORE: 8
ADLS_ACUITY_SCORE: 9
ADLS_ACUITY_SCORE: 8
ADLS_ACUITY_SCORE: 8

## 2021-11-27 NOTE — PLAN OF CARE
"[id number]    Inpatient Progress Note:    /67 (BP Location: Left arm)   Pulse 77   Temp 97.9  F (36.6  C) (Axillary)   Resp 16   Ht 1.676 m (5' 6\")   Wt 69.2 kg (152 lb 8 oz)   SpO2 99%   BMI 24.61 kg/m       Orientation: alert and oriented x4  Neuro: numbness to BLE unchanged, pt states no better or worse since admission, able to wiggle toes, bend knees, and perform slight dorsiflexion/plantarflexion   Pain status: denies  Activity: up with assist of 1  Resp: WDL, LS clear and equal bilaterally, CPAP used overnight   Cardiac: WDL  GI: BS hypoactive, patient reports constipation - milk of magnesia administered with no results yet; stool sample needed  : WDL  Skin: WDL  LDA: peripheral IV removed overnight d/t discomfort - pt refused placement of new IV site until IV solumedrol due  Infusions: Q24H IV solumedrol   Pertinent Labs: see pending labs, awaiting cultures  Diet: advanced to regular diet  Consults: neurology, PT/OT  Discharge Plan: per MD: Solu-Medrol 1 g IV daily for 5 days, if no improvement after 5 days he would need to xfer to another facility for plasmapheresis, await neurology and PT/OT consult    Contact precautions maintained    Will continue to monitor and provide cares.     Miguelina James, RN         "

## 2021-11-27 NOTE — CONSULTS
"CLINICAL NUTRITION SERVICES  -  ASSESSMENT NOTE      MALNUTRITION:  % Weight Loss: Severe wt loss based on report in the last 3 weeks, will need to confirm with reweigh  % Intake:  <75% for > 7 days (moderate malnutrition)  Subcutaneous Fat Loss: Declined NFPE  Muscle Loss: Declined NFPE  Fluid Retention:  None documented    Malnutrition Diagnosis: Unable to determine due to unclear accuracy of admit wt and decline of NFPE        REASON FOR ASSESSMENT  Akash James is a 36 year old male seen by Registered Dietitian for Admission Nutrition Risk Screen for positive.    PMH of: Migraines, CARLY, schizophrenia.    Admit 2/2: Headache, N/V/D, progressive BLE weakness.    NUTRITION HISTORY  - Information obtained from patient and chart.  - Diet at home: Vegan, confirmed avoids meats, eggs, and dairy products.  Most of protein sources are from beans.  - Usual intakes: Meals TID.  - Barriers to PO intakes: Having headache, N/V/D, and overall decreased appetite PTA.  Notes he was having smaller portions consistently over the past ~3 weeks.  - Use of oral supplements: None, describes he is actually against these.    - Chewing/swallowing issues: Denied.  - Allergies: NKFA.      CURRENT NUTRITION ORDERS  Diet Order:     Regular  Current Intake/Tolerance:  Limited timeframe since admission.  Provided with vegan menu.      NUTRITION FOCUSED PHYSICAL ASSESSMENT FOR DIAGNOSING MALNUTRITION)  Yes    Obtained from Chart/Interdisciplinary Team:  - Neuro following, concern for MS or neuromyelitis  - No documentation of PI  - Stooling patterns reviewed    ANTHROPOMETRICS  Height: 5' 6\"  Weight: 152 lbs 8 oz  Body mass index is 24.61 kg/m .  Weight Status:  Normal BMI  Weight History:  Wt Readings from Last 10 Encounters:   11/26/21 69.2 kg (152 lb 8 oz)     - Wt of 194# from 3/31/2021.    - Patient reports his baseline wt is 190# and he last weighed 3 weeks ago.  Would indicate a 20% wt loss based on report, will need to confirm " accuracy of admit wt.  No overt muscle loss?    LABS  Labs reviewed:  Electrolytes  Potassium (mmol/L)   Date Value   11/27/2021 4.1   11/26/2021 3.9   06/30/2007 3.9   06/28/2007 4.0   11/05/2006 3.7    Blood Glucose  Glucose (mg/dL)   Date Value   11/27/2021 126 (H)   11/26/2021 112 (H)   06/30/2007 86   06/28/2007 88   11/05/2006 91     Hemoglobin A1C (%)   Date Value   11/27/2021 5.4    Inflammatory Markers  CRP Inflammation (mg/L)   Date Value   11/26/2021 <2.9   09/13/2008 <3.0     WBC (10e9/L)   Date Value   09/13/2008 3.3 (L)   06/30/2007 5.1   06/28/2007 4.7     WBC Count (10e3/uL)   Date Value   11/27/2021 4.2   11/26/2021 4.0     Albumin (g/dL)   Date Value   11/26/2021 3.6   06/30/2007 4.3      Sodium (mmol/L)   Date Value   11/27/2021 136   11/26/2021 132 (L)   06/30/2007 141   06/28/2007 142   11/05/2006 138    Renal  Urea Nitrogen (mg/dL)   Date Value   11/27/2021 11   11/26/2021 9   06/30/2007 11   06/28/2007 11   11/05/2006 10     Creatinine (mg/dL)   Date Value   11/27/2021 0.93   11/26/2021 1.05   06/30/2007 1.10   06/28/2007 0.99   11/05/2006 1.04     Additional  Ketones Urine (mg/dL)   Date Value   11/26/2021 Trace (A)        B/P: 130/96, T: 97.7, P: 80, R: 17      MEDICATIONS  Medications reviewed:    insulin aspart  1-7 Units Subcutaneous TID AC     insulin aspart  1-5 Units Subcutaneous At Bedtime     methylPREDNISolone  1,000 mg Intravenous Q24H     pantoprazole  40 mg Oral QAM AC     senna-docusate  1 tablet Oral BID     sodium chloride (PF)  3 mL Intracatheter Q8H             ASSESSED NUTRITION NEEDS PER APPROVED PRACTICE GUIDELINES:    Dosing Weight 69 kg  Estimated Energy Needs: 25-30 Kcal/Kg  Justification: maintenance  Estimated Protein Needs: 1-1.2+ g pro/Kg  Justification: preservation of lean body mass  Estimated Fluid Needs: per MD      NUTRITION DIAGNOSIS:  Inadequate oral intake related to decreased appetite, headache, N/V/D as evidenced by report of <75% needs x 3 weeks PTA,  reported severe wt loss though unclear accuracy.      NUTRITION INTERVENTIONS  Recommendations / Nutrition Prescription  Diet per MD.  Provided with vegan menu and encouraged beans as a protein source.      Declined oral supplement offering ([employer]).        Implementation  Nutrition education: Provided education on above.    Nutrition Goals  Patient to consume at least 75% of meals TID while admitted.      MONITORING AND EVALUATION:  Progress towards goals will be monitored and evaluated per protocol and Practice Guidelines          Padmaja Pearson RDN, LD  Clinical Dietitian  3rd floor/ICU: [phone]  All other floors: [phone]  Weekend/holiday: [phone]

## 2021-11-27 NOTE — PROGRESS NOTES
11/27/21 1100   Quick Adds   Type of Visit Initial Occupational Therapy Evaluation   Living Environment   People in home alone   Current Living Arrangements apartment   Home Accessibility stairs to enter home   Number of Stairs, Main Entrance 10   Stair Railings, Main Entrance railings on both sides of stairs   Living Environment Comments Pt reports grab bars in bathroom, shower and toilet. 1 flight (10STE apartment), pt reports able to navigate stairs while using railing but main concern is balance and instability.    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 Concerned about current functional status and ability to keep shop open. Pt IND at baseline for ADLs. Uses grab bar in bathroom for toilet transfer   Instrumental Activities of Daily Living (IADL)   IADL Comments IND w/ IADLs at baseline, still drives reports no concern with this utilizes cruise control and manages numbness/tingling. CPAP used overnight for sleep mgmt.    Disability/Function   Hearing Difficulty or Deaf no   Wear Glasses or Blind no  (reports needing glasses for distance but does not currently )   Concentrating, Remembering or Making Decisions Difficulty yes   Concentration Management sometimes dificulty with short term memory    Difficulty Communicating no   Difficulty Eating/Swallowing no   Walking or Climbing Stairs Difficulty yes   Walking or Climbing Stairs   (uses raillings)   Mobility Management Currently Ax1 w/ gaitbelt - defer to PT for mobility recommendation likely will require AD   Dressing/Bathing Difficulty no   Toileting issues no   Doing Errands Independently Difficulty (such as shopping) no   Fall history within last six months no   General Information   Onset of Illness/Injury or Date of Surgery 11/26/21   Referring Physician  "Mj Lorenzo MD   Patient/Family Therapy Goal Statement (OT) \"to get my legs working again\"    Additional Occupational Profile Info/Pertinent History of Current Problem Akash James is a 36 year old male with a history of migraines, recently diagnosed CARLY and prescribed CPAP, paranoid schizophrenia (but on no medications),  admitted on 11/26/2021  with 3 week hx of headache, ongoing fevers, n/v/d, bilateral LE weakness and now with inability to walk and SO is unable to care for patient.    Existing Precautions/Restrictions fall   General Observations and Info Unsteady gait, CGA Ax1 for mobility w/o AD at time of eval, defer to PT for AD recommendations    Cognitive Status Examination   Orientation Status orientation to person, place and time   Cognitive Status Comments Pt reports some difficulty w/ short term memory and recall. Example used from Dr. lacy, SO asked questions and pt unable to recall the appointment and information.    Visual Perception   Visual Impairment/Limitations WFL   Sensory   Sensory Comments Numbness/tingling from belly button down.    Pain Assessment   Patient Currently in Pain No   Range of Motion Comprehensive   Comment, General Range of Motion WFL   Strength Comprehensive (MMT)   Comment, General Manual Muscle Testing (MMT) Assessment UE WFL, Minimal deficits noted w/ plantar flexion against resistance.    Coordination   Coordination Comments WFL - demonstrates ability to manage toothbrush/toothpaste.    Bed Mobility   Comment (Bed Mobility) SBA    Balance   Balance Comments Impaired static and dynamic balance    Clinical Impression   Criteria for Skilled Therapeutic Interventions Met (OT) yes;meets criteria;skilled treatment is necessary   OT Diagnosis Impaired ADL/IADL independence w/ functional mobility deficits   OT Problem List-Impairments impacting ADL problems related to;activity tolerance impaired;balance;strength;mobility   Assessment of Occupational Performance " 3-5 Performance Deficits   Identified Performance Deficits functional mobility, community mobility, home mgmt, work, driving   Planned Therapy Interventions (OT) ADL retraining;IADL retraining;strengthening;home program guidelines;progressive activity/exercise   Clinical Decision Making Complexity (OT) moderate complexity   Therapy Frequency (OT) Daily   Predicted Duration of Therapy 5 days   Risk & Benefits of therapy have been explained evaluation/treatment results reviewed;care plan/treatment goals reviewed;risks/benefits reviewed;current/potential barriers reviewed;participants voiced agreement with care plan;participants included;patient;mother;sibling   OT Discharge Planning    OT Discharge Recommendation (DC Rec) Transitional Care Facility;home with home care occupational therapy;home with outpatient occupational therapy   OT Rationale for DC Rec Pt functioning signifiantly below baseline for functional mobility, currently requires Ax1 for gait and balance deficits. Pt would benefit from TCU stay to progress functional mobility and safety w/ gait, if unable to d/c to TCU pt would require Ax1 for mobility, assist for driving, and IADLs. Outpatient OT if pt has ride to appointments, HHOT if unable to obtain ride for appointments.   Total Evaluation Time (Minutes)   Total Evaluation Time (Minutes) 10

## 2021-11-27 NOTE — PROGRESS NOTES
[facility]  Internal Medicine  Progress Note    Date of Service: 11/27/2021    Patient: Akash James  MRN: [id number]  Admission Date: 11/26/2021  Hospital Day # 2    Assessment & Plan: Akash James is a 36 year old male male with a history of migraines, recently diagnosed CARLY and prescribed CPAP, paranoid schizophrenia (but on no medications) who was admitted on 11/26/2021 with 3 week hx of headache, ongoing fevers, bilateral LE weakness and now with inability to walk.     He was seen in the ER 11/11/21 for 5 day hx of fevers/cough/bodyaches/headache/nausea but no vomiting.  His temps were as high as 103.  COVID negative as was other workup, and he was discharged home.  Since that time he has had ongoing poor po intake, 15 pound weight loss, headache, bilateral numbness/weakness of his LE has led to his inability to walk on his own.       CNS Inflammatory Process - hx as above.  MRI of the brain and spinal cord revealed diffuse inflammatory process concerning for multiple sclerosis or neuromyelitis optica versus much less likely infectious etiology. Per Neurology, concern for ADEM vs NMOSD vs other demyelinating/autoimmune pathology.  - Neurology consulted and recommend Solu-Medrol 1 g IV daily for 5 days (currently day 2)   - If no improvement after 5 days he would need to xfer to another facility for plasmapharesis.  - MRI orbits pending  - Await CSF studies including a oligoclonal banding, viral studies and cultures.  - Lab work up thus far with low vitamin D level, ESR 16, positive strep throat swab as below,  - Negative thus far in the lab work up:  Blood cultures, CK, crp, HIV, Lyme violette, Influenza, COVID-19, HSV, TSH, B12, Treponema Violette  - Pending thus far in the lab work up: DS dna, SSB, SSA, ACE level, NMO antibodies, Anti MOG, CAMACHO  - PT/OT consults  - will start ISS protocol and Protonix while on high dose steroids     Strep throat - strep positive 11/26 s/p single dose of IM pcn G.  " Asymptomatic today     CARLY - continue cpap     Schizophrenia - not currently not on medications     Constipation - start scheduled Senna S and prn Miralax.  Continue prn enema.  Discontinue enteric precautions.    CODE: full  DVT: scd  GI: Protonix  Diet/fluids: regular    Lotus Eckertjessenia ALEXIS PA-C  Hospitalist Physician Assistant  [facility]  Pager: [phone]      Subjective & Interval Hx:    Patient reports no changes in his BLE weakness or numbness.  He denies chest pain, shortness of breath, upper body weakness.  He feels numb starting at the level of his umbilicus.  He reports constipation with no BM in the past 4 days.    Last 24 hr care team notes reviewed.   ROS:  4 point ROS including Respiratory, CV, GI and , other than that noted in the HPI, is negative.  Physical Exam:    Blood pressure 109/64, pulse 84, temperature 98.7  F (37.1  C), temperature source Oral, resp. rate 18, height 1.676 m (5' 6\"), weight 69.2 kg (152 lb 8 oz), SpO2 97 %. on room air  General: Alert, interactive, NAD  HEENT: AT/NC  Resp: clear to auscultation bilaterally, no crackles or wheezes  Cardiac: regular rate and rhythm, no murmur  Abdomen: Soft, nontender, nondistended. +BS.  Skin: Warm and dry  Neuro: Alert & oriented, sensation intact above the level of the umbilicus, decreased sensation in the abdomen below the umbilicus, denies sensation in the BLE.  5/5 BUE strength.  2/5 in BLE    Labs & Images:  Reviewed in Epic   Medications:    Current Facility-Administered Medications   Medication     acetaminophen (TYLENOL) tablet 650 mg    Or     acetaminophen (TYLENOL) Suppository 650 mg     alum & mag hydroxide-simethicone (MAALOX) suspension 30 mL     bisacodyl (DULCOLAX) Suppository 10 mg     glucose gel 15-30 g    Or     dextrose 50 % injection 25-50 mL    Or     glucagon injection 1 mg     insulin aspart (NovoLOG) injection (RAPID ACTING)     insulin aspart (NovoLOG) injection (RAPID ACTING)     lidocaine (LMX4) " cream     lidocaine 1 % 0.1-1 mL     magnesium hydroxide (MILK OF MAGNESIA) suspension 30 mL     melatonin tablet 3 mg     methylPREDNISolone sodium succinate (solu-MEDROL) 1,000 mg in sodium chloride 0.9 % 266 mL intermittent infusion     ondansetron (ZOFRAN-ODT) ODT tab 4 mg    Or     ondansetron (ZOFRAN) injection 4 mg     pantoprazole (PROTONIX) EC tablet 40 mg     polyethylene glycol (MIRALAX) Packet 17 g     senna-docusate (SENOKOT-S/PERICOLACE) 8.6-50 MG per tablet 1 tablet     senna-docusate (SENOKOT-S/PERICOLACE) 8.6-50 MG per tablet 1 tablet    Or     senna-docusate (SENOKOT-S/PERICOLACE) 8.6-50 MG per tablet 2 tablet     sodium chloride (PF) 0.9% PF flush 3 mL     sodium chloride (PF) 0.9% PF flush 3 mL     sodium chloride (PF) 0.9% PF flush 3 mL     sodium phosphate (FLEET ENEMA) 1 enema

## 2021-11-27 NOTE — PROGRESS NOTES
"[facility]  Neurology Progress Note        Daniela Saez MD   11/27/2021        Interval History:        Had MRI orbit done and reassuring,  Nursing reports some improvement in leg strength and significant improvement in headache now rating 0/10.          Physical Exam:       , Blood pressure 109/64, pulse 84, temperature 98.7  F (37.1  C), temperature source Oral, resp. rate 18, height 1.676 m (5' 6\"), weight 69.2 kg (152 lb 8 oz), SpO2 97 %.  Vitals:    11/26/21 1458 11/26/21 1502   Weight: 69 kg (152 lb 3.2 oz) 69.2 kg (152 lb 8 oz)     Vital Signs with Ranges  Temp:  [97.7  F (36.5  C)-99.1  F (37.3  C)] 98.7  F (37.1  C)  Pulse:  [76-84] 84  Resp:  [16-18] 18  BP: (108-130)/(62-96) 109/64  FiO2 (%):  [40 %] 40 %  SpO2:  [96 %-99 %] 97 %  I/O's Last 24 hours  I/O last 3 completed shifts:  In: -   Out: 450 [Urine:450]      Cardio - Heart Rate Reg, Distal pulses palpable  Resp - Breathing non labored     Neurological Exam:  General: Mental status -Alert and orientated, speech without dysarthria, language fluent with intact naming and repetition  Cranial Nerves-  Pupils equal round and reactive to light. Extraocular movements intact. No nystagmus.  Face symmetric and intact to light touch, tongue midline, palate activates symmetrically. Shoulder shrug 5/5. Does have constant hiccups   Motor: Normal muscle bulk and tone.  Has 5/5 strength in bilateral uppers.  In legs R/L - hip flexion 4/4 fatigues to 4-/4-, knee extension 4+4+ but fatigues to <3/<3, ADF 3/4- but fatigues to <3 bitaterally  Sensation: intact to light touch and vibration throughout uppers,  In legs vibration now absent at knees and below   Reflexes:  Biceps 2/2, brachioradialis 2/2, patella 3/3, ankles 3/3, toes upgoing  Cerebellar: intact FNF  Gait: in bed.          Medications:          [START ON 11/28/2021] cyanocobalamin  1,000 mcg Oral Daily     insulin aspart  1-7 Units Subcutaneous TID AC     insulin aspart  1-5 Units " Subcutaneous At Bedtime     methylPREDNISolone  1,000 mg Intravenous Q24H     pantoprazole  40 mg Oral QAM AC     senna-docusate  1 tablet Oral BID     sodium chloride (PF)  3 mL Intracatheter Q8H     PRN Meds: acetaminophen **OR** acetaminophen, alum & mag hydroxide-simethicone, bisacodyl, glucose **OR** dextrose **OR** glucagon, lidocaine 4%, lidocaine (buffered or not buffered), magnesium hydroxide, melatonin, ondansetron **OR** ondansetron, polyethylene glycol, senna-docusate **OR** senna-docusate, sodium chloride (PF), sodium chloride (PF), sodium phosphate         Data:      All new lab and imaging data was reviewed.   Vit D 12,  B12 in227         Assessment and Plan:          Mr. James is 36 year old male with history notes indicating hx of schizophrenia but denies diagnosis now admitted on 11/26 with 3 week history of hiccups/nausea/vomitting/fever/body aches then with development of changes in vision in right eye and bilateral leg weakness/numbness from waist down.  Now with MRIs with acute enhancing lesions seen in medulla (consistent with area postrema syndrome), longitudinally extensive cervical cord lesion and thoracic lesion.  All lesions active and no other non active T2 lesions seen.  Exam with bilateral leg weakness/numbness.    Concern for ADEM vs NMOSD vs other demyelinating/autoimmune pathology.  Fevers body/aches typically not seen with demyelination but ADEM often seen secondary to viral/infectious cause.      Complete screening work up with serum labs - NMO/MOG - (I wrote for send out to [place] as FACS sample more sensitive than test done with [facility]), RPR, Lyme, HIV,  ACE, CAMACHO, SSA/SSB, ANti DS antibody, TSH, B12, Vit D        - B12 low normal -goal > 350 start Vit B12 1000mcg daily (reports is vegan)         - Vit D low - goal 60-80 - start 50,000 Vit D 2 weekly         - Pending thus far in the lab work up: DS dna, SSB, SSA, ACE level, NMO antibodies, Anti MOG, CAMACHO     CSF - Oligoclonal  bands and IgG index sent through ER, Lyme was also sent through ER - no further CSF available per lab.  For now no need to repeat tap.  Consider in future pending further lab work up      - send out labs pending      Vision changes - MRI orbit MRI reassuring      Treatment - Continued IV SoluMedrol 1000mg x 5 days, while on high dose steroids monitor glucose, BPs, start PPI oral for stomach protection.  Pending response consider IVIG after steroid completed  (IVIG could be used if lack of improvement vs needing plasma exchange)  SPT/OT - could benefit form likely acute rehab placement pending response     Symptoms  Weakness/numbness - PT/OT  Hiccups - area postrema syndrome - hopefully will improve with steroids consider anti-nausea as needed  Headache - improving  Bladder/Bowel - monitor for neurogenic bladder/ reports constipation - PRN laxative - consider miralax if needed        Other  Strep - per primary team -   CARLY - per primary team     29 min

## 2021-11-27 NOTE — UTILIZATION REVIEW
"[facility]   Admission Status; Secondary Review Determination     Admission Date: 11/26/2021 12:46 AM      Under the authority of the Utilization Management Committee, the utilization review process indicated a secondary review on the above patient.  The review outcome is based on review of the medical records, discussions with staff, and applying clinical experience noted on the date of the review.        (X)      Inpatient Status Appropriate - This patient's medical care is consistent with medical management for inpatient care and reasonable inpatient medical practice.      () Observation Status Appropriate - This patient does not meet hospital inpatient criteria and is placed in observation status. If this patient's primary payer is Medicare and was admitted as an inpatient, Condition Code 44 should be used and patient status changed to \"observation\".   () Admission Status NOT Appropriate - This patient's medical care is not consistent with medical management for Inpatient or Observation Status.          RATIONALE FOR DETERMINATION   37 yo male with schizophrenia and CARLY who presented to the ER with bilateral LE weakness/numbness in setting of fevers, myalgias and a headache with associated nausea/emesis. MRI notable foracute enhancing lesions in medulla and  longitudinally extensive cervical and thoracic cord lesions. Neurology consulted and differential includes ADEM vs NMOSD vs other demyelinating/autoimmune pathology. Extensive blood and spinal fluid labs are pending. He's been started on IV steroids and pending response may need plasmapheresis. Given above, including risk, complexity and level of intensity of service he is appropriate for inpatient cares     The severity of illness, intensity of service provided, expected LOS and risk for adverse outcome make the care complex, high risk and appropriate for hospital admission.      The information on this document is developed by the utilization " review team in order for the business office to ensure compliance.  This only denotes the appropriateness of proper admission status and does not reflect the quality of care rendered.         The definitions of Inpatient Status and Observation Status used in making the determination above are those provided in the CMS Coverage Manual, Chapter 1 and Chapter 6, section 70.4.      Sincerely,     Alecia Dominguez MD MPH  Utilization Review  [facility].

## 2021-11-28 ENCOUNTER — APPOINTMENT (OUTPATIENT)
Dept: OCCUPATIONAL THERAPY | Facility: CLINIC | Age: 36
End: 2021-11-28
Payer: COMMERCIAL

## 2021-11-28 LAB
ACE SERPL-CCNC: 21 U/L
GLUCOSE BLDC GLUCOMTR-MCNC: 126 MG/DL (ref 70–99)
GLUCOSE BLDC GLUCOMTR-MCNC: 128 MG/DL (ref 70–99)
GLUCOSE BLDC GLUCOMTR-MCNC: 137 MG/DL (ref 70–99)
GLUCOSE BLDC GLUCOMTR-MCNC: 142 MG/DL (ref 70–99)

## 2021-11-28 PROCEDURE — 250N000013 HC RX MED GY IP 250 OP 250 PS 637: Performed by: PSYCHIATRY & NEUROLOGY

## 2021-11-28 PROCEDURE — 97530 THERAPEUTIC ACTIVITIES: CPT | Mod: GO

## 2021-11-28 PROCEDURE — 97535 SELF CARE MNGMENT TRAINING: CPT | Mod: GO

## 2021-11-28 PROCEDURE — 99207 PR CDG-MDM COMPONENT: MEETS LOW - DOWN CODED: CPT | Performed by: PHYSICIAN ASSISTANT

## 2021-11-28 PROCEDURE — 258N000003 HC RX IP 258 OP 636: Performed by: INTERNAL MEDICINE

## 2021-11-28 PROCEDURE — 99232 SBSQ HOSP IP/OBS MODERATE 35: CPT | Performed by: PHYSICIAN ASSISTANT

## 2021-11-28 PROCEDURE — 250N000011 HC RX IP 250 OP 636: Performed by: INTERNAL MEDICINE

## 2021-11-28 PROCEDURE — 250N000013 HC RX MED GY IP 250 OP 250 PS 637: Performed by: PHYSICIAN ASSISTANT

## 2021-11-28 PROCEDURE — 120N000004 HC R&B MS OVERFLOW

## 2021-11-28 RX ADMIN — ONDANSETRON 4 MG: 4 TABLET, ORALLY DISINTEGRATING ORAL at 01:44

## 2021-11-28 RX ADMIN — SENNOSIDES AND DOCUSATE SODIUM 1 TABLET: 50; 8.6 TABLET ORAL at 08:33

## 2021-11-28 RX ADMIN — CYANOCOBALAMIN TAB 1000 MCG 1000 MCG: 1000 TAB at 08:33

## 2021-11-28 RX ADMIN — SODIUM CHLORIDE 1000 MG: 9 INJECTION, SOLUTION INTRAVENOUS at 14:26

## 2021-11-28 ASSESSMENT — ACTIVITIES OF DAILY LIVING (ADL)
ADLS_ACUITY_SCORE: 6
ADLS_ACUITY_SCORE: 6
DEPENDENT_IADLS:: INDEPENDENT
ADLS_ACUITY_SCORE: 8
ADLS_ACUITY_SCORE: 6
ADLS_ACUITY_SCORE: 8

## 2021-11-28 NOTE — PROGRESS NOTES
"[facility]  Neurology Progress Note        Daniela Saez MD   11/28/2021        Interval History:      Reports ongoing improvement in leg strength and sensation.  Continued hiccups.                   Physical Exam:       , Blood pressure 112/69, pulse 69, temperature 98.6  F (37  C), temperature source Oral, resp. rate 16, height 1.676 m (5' 6\"), weight 69.2 kg (152 lb 8 oz), SpO2 97 %.  Vitals:    11/26/21 1458 11/26/21 1502   Weight: 69 kg (152 lb 3.2 oz) 69.2 kg (152 lb 8 oz)     Vital Signs with Ranges  Temp:  [97.2  F (36.2  C)-98.6  F (37  C)] 98.6  F (37  C)  Pulse:  [69-81] 69  Resp:  [16-18] 16  BP: (112-123)/(66-69) 112/69  SpO2:  [97 %-98 %] 97 %       Cardio - Heart Rate Reg, Distal pulses palpable  Resp - Breathing non labored     Neurological Exam:  General: Mental status -Alert and orientated, speech without dysarthria, language fluent with intact naming and repetition  Cranial Nerves-  Pupils equal round and reactive to light. Extraocular movements intact. No nystagmus.  Face symmetric and intact to light touch, tongue midline, palate activates symmetrically. Shoulder shrug 5/5. Does have constant hiccups   Motor: Normal muscle bulk and tone.  Has 5/5 strength in bilateral uppers.  In legs R/L - hip flexion 5-/5- fatigues to 4-/4-, knee extension 5-/5- , ADF 5-/5- but fatigues to 4/4  Sensation: intact to light touch and vibration throughout uppers,  In legs vibration now intact at knees and ankles and toes but toes <10 sec,  Patchy light touch in legs  Reflexes:  Deferred today  Cerebellar: intact FNF,  Did not try HKS today  Gait: in bed.            Medications:          cholecalciferol  1,250 mcg Oral Q7 Days     cyanocobalamin  1,000 mcg Oral Daily     insulin aspart  1-7 Units Subcutaneous TID AC     insulin aspart  1-5 Units Subcutaneous At Bedtime     methylPREDNISolone  1,000 mg Intravenous Q24H     pantoprazole  40 mg Oral QAM AC     senna-docusate  1 tablet Oral BID "     sodium chloride (PF)  3 mL Intracatheter Q8H     PRN Meds: acetaminophen **OR** acetaminophen, alum & mag hydroxide-simethicone, bisacodyl, glucose **OR** dextrose **OR** glucagon, lidocaine 4%, lidocaine (buffered or not buffered), magnesium hydroxide, melatonin, ondansetron **OR** ondansetron, polyethylene glycol, senna-docusate **OR** senna-docusate, sodium chloride (PF), sodium chloride (PF), sodium phosphate         Data:      All new lab and imaging data was reviewed.            Assessment and Plan:        Mr. James is 36 year old male with history notes indicating hx of schizophrenia but denies diagnosis now admitted on 11/26 with 3 week history of hiccups/nausea/vomitting/fever/body aches then with development of changes in vision in right eye and bilateral leg weakness/numbness from waist down.  Now with MRIs with acute enhancing lesions seen in medulla (consistent with area postrema syndrome), longitudinally extensive cervical cord lesion and thoracic lesion.  All lesions active and no other non active T2 lesions seen.  Exam with bilateral leg weakness/numbness.    Concern for ADEM vs NMOSD vs other demyelinating/autoimmune pathology.  Fevers body/aches typically not seen with demyelination but ADEM often seen secondary to viral/infectious cause.   Improving with steroid dosing now on day 3/5 and tolerating well     Complete screening work up with serum labs - NMO/MOG - (I wrote for send out to [place] as FACS sample more sensitive than test done with [facility]), RPR, Lyme, HIV,  ACE, CAMACHO, SSA/SSB, ANti DS antibody, TSH, B12, Vit D        - B12 low normal -goal > 350 start Vit B12 1000mcg daily (reports is vegan)         - Vit D low - goal 60-80 - start 50,000 Vit D 2 weekly          - resulted - ace wnl        - Pending thus far in the lab work up: DS dna, SSB, SSA, NMO antibodies, Anti MOG, CAMACHO     CSF - Oligoclonal bands and IgG index sent through ER, Lyme was also sent through ER - no further CSF  available per lab.  For now no need to repeat tap.  Consider in future pending further lab work up      - send out labs pending      Vision changes - MRI orbit MRI reassuring      Treatment - Continued IV SoluMedrol 1000mg x 5 days, while on high dose steroids monitor glucose, BPs, start PPI oral for stomach protection.  Pending response consider IVIG after steroid completed  (IVIG could be used if lack of improvement vs needing plasma exchange)  SPT/OT - could benefit form likely acute rehab placement pending continued response     Symptoms  Weakness/numbness - PT/OT  Hiccups - area postrema syndrome - hopefully will improve with steroids consider anti-nausea as needed  Headache - improving  Bladder/Bowel - monitor for neurogenic bladder/ reports constipation - PRN laxative - consider miralax if needed        Other  Strep - per primary team -   CARLY - per primary team

## 2021-11-28 NOTE — PLAN OF CARE
"AOx4. Up SBA d/t BLE weakness/numbness. Numbness/weakness in legs is no better and no worse per pt.  Strength upon assessment seems improved from yesterday. Denies pain. Denies nausea. Continues to have hiccups. IV solumedrol given.  and 97 today. One large BM this shift after suppository. Pt showered w/ help from S.O. MRI orbital complete.  Neuro/PT/OT following. /64 (BP Location: Left arm)   Pulse 84   Temp 98.7  F (37.1  C) (Oral)   Resp 18   Ht 1.676 m (5' 6\")   Wt 69.2 kg (152 lb 8 oz)   SpO2 97%   BMI 24.61 kg/m     "

## 2021-11-28 NOTE — PROGRESS NOTES
[facility]  Internal Medicine  Progress Note    Date of Service: 11/28/2021    Patient: Akash James  MRN: [id number]  Admission Date: 11/26/2021  Hospital Day # 3    Assessment & Plan: Akash James is a 36 year old male male with a history of migraines, recently diagnosed CARLY and prescribed CPAP, paranoid schizophrenia (but on no medications) who was admitted on 11/26/2021 with 3 week hx of headache, ongoing fevers, bilateral LE weakness and now with inability to walk.     He was seen in the ER 11/11/21 for 5 day hx of fevers/cough/bodyaches/headache/nausea but no vomiting.  His temps were as high as 103.  COVID negative as was other workup, and he was discharged home.  Since that time he has had ongoing poor po intake, 15 pound weight loss, headache, bilateral numbness/weakness of his LE has led to his inability to walk on his own.       CNS Inflammatory Process - hx as above.  MRI of the brain and spinal cord revealed diffuse inflammatory process concerning for multiple sclerosis or neuromyelitis optica versus much less likely infectious etiology. Per Neurology, concern for ADEM vs NMOSD vs other demyelinating/autoimmune pathology.  MRI orbits negative  - Neurology consulted and recommend Solu-Medrol 1 g IV daily for 5 days (currently day 3)   - If no improvement after 5 days he would need to xfer to another facility for plasmapharesis.  - patient with improvement in BLE strength in the past 24 hours  - Await CSF studies including a oligoclonal banding, viral studies and cultures.  - Lab work up thus far with low vitamin D level, ESR 16, positive strep throat swab as below, B12 low normal  - started on B12 and Vitamin D  - Negative thus far in the lab work up:  Blood cultures, CK, crp, HIV, Lyme violette, Influenza, COVID-19, HSV, TSH, Treponema Violette, ACE  - Pending thus far in the lab work up: DS dna, SSB, SSA, NMO antibodies, Anti MOG, CAMACHO  - PT/OT consults  - continue ISS protocol and  "Protonix while on high dose steroids     Strep throat - strep positive 11/26 s/p single dose of IM pcn G.  Asymptomatic.     CARLY - continue cpap     Schizophrenia - not currently not on medications     Constipation, resolved - had a BM overnight.  Continue scheduled Senna S and prn Miralax and enema.     CODE: full  DVT: scd  GI: Protonix  Diet/fluids: regular    Lotus Rubalcava MS LOVELL  Hospitalist Physician Assistant  [facility]  Pager: [phone]      Subjective & Interval Hx:    Patient reports he had a BM yesterday.  Denies any pain today.  Feels fatigued.  Tolerating po well.  Reports increased strength and mobility in his BLE    Last 24 hr care team notes reviewed.   ROS:  4 point ROS including Respiratory, CV, GI and , other than that noted in the HPI, is negative.    Physical Exam:    Blood pressure 112/69, pulse 69, temperature 98.6  F (37  C), temperature source Oral, resp. rate 16, height 1.676 m (5' 6\"), weight 69.2 kg (152 lb 8 oz), SpO2 97 %.  General: Alert, interactive, NAD  HEENT: AT/NC  Resp: clear to auscultation bilaterally, no crackles or wheezes  Cardiac: regular rate and rhythm, no murmur  Abdomen: Soft, nontender, nondistended. +BS.  Extremities: able to bend his bilateral knees.  Able to straight leg raise today ~15-20 degrees.  BUE 5/5 strength  Skin: Warm and dry  Neuro: Alert & oriented x 3    Labs & Images:  Reviewed in Epic   Medications:    Current Facility-Administered Medications   Medication     acetaminophen (TYLENOL) tablet 650 mg    Or     acetaminophen (TYLENOL) Suppository 650 mg     alum & mag hydroxide-simethicone (MAALOX) suspension 30 mL     bisacodyl (DULCOLAX) Suppository 10 mg     cyanocobalamin (VITAMIN B-12) tablet 1,000 mcg     glucose gel 15-30 g    Or     dextrose 50 % injection 25-50 mL    Or     glucagon injection 1 mg     insulin aspart (NovoLOG) injection (RAPID ACTING)     insulin aspart (NovoLOG) injection (RAPID ACTING)     lidocaine (LMX4) " cream     lidocaine 1 % 0.1-1 mL     magnesium hydroxide (MILK OF MAGNESIA) suspension 30 mL     melatonin tablet 3 mg     methylPREDNISolone sodium succinate (solu-MEDROL) 1,000 mg in sodium chloride 0.9 % 266 mL intermittent infusion     ondansetron (ZOFRAN-ODT) ODT tab 4 mg    Or     ondansetron (ZOFRAN) injection 4 mg     pantoprazole (PROTONIX) EC tablet 40 mg     polyethylene glycol (MIRALAX) Packet 17 g     senna-docusate (SENOKOT-S/PERICOLACE) 8.6-50 MG per tablet 1 tablet     senna-docusate (SENOKOT-S/PERICOLACE) 8.6-50 MG per tablet 1 tablet    Or     senna-docusate (SENOKOT-S/PERICOLACE) 8.6-50 MG per tablet 2 tablet     sodium chloride (PF) 0.9% PF flush 3 mL     sodium chloride (PF) 0.9% PF flush 3 mL     sodium chloride (PF) 0.9% PF flush 3 mL     sodium phosphate (FLEET ENEMA) 1 enema

## 2021-11-28 NOTE — CONSULTS
Care Management Initial Consult    General Information  Assessment completed with: Patient,    Type of CM/SW Visit: Initial Assessment    Primary Care Provider verified and updated as needed: Yes   Readmission within the last 30 days:        Reason for Consult: care coordination/care conference,discharge planning  Advance Care Planning:            Communication Assessment  Patient's communication style: spoken language (English or Bilingual)    Hearing Difficulty or Deaf: no   Wear Glasses or Blind: no    Cognitive  Cognitive/Neuro/Behavioral: WDL                      Living Environment:   People in home: alone     Current living Arrangements: apartment      Able to return to prior arrangements: yes       Family/Social Support:  Care provided by: self  Provides care for: no one  Marital Status: Single  Significant Other        (Passion)  Description of Support System: Supportive,Involved    Support Assessment: Adequate family and caregiver support    Current Resources:   Patient receiving home care services:       Community Resources:    Equipment currently used at home: none  Supplies currently used at home:      Employment/Financial:  Employment Status: employed full-time        Financial Concerns: No concerns identified           Lifestyle & Psychosocial Needs:  Social Determinants of Health     Tobacco Use: Not on file   Alcohol Use: Not on file   Financial Resource Strain: Not on file   Food Insecurity: Not on file   Transportation Needs: Not on file   Physical Activity: Not on file   Stress: Not on file   Social Connections: Not on file   Intimate Partner Violence: Not on file   Depression: Not on file   Housing Stability: Not on file       Functional Status:  Prior to admission patient needed assistance:   Dependent ADLs:: Independent  Dependent IADLs:: Independent       Mental Health Status:  Mental Health Status: No Current Concerns       Chemical Dependency Status:  Chemical Dependency Status: No Current  Concerns             Values/Beliefs:  Spiritual, Cultural Beliefs, Denominational Practices, Values that affect care: no               Additional Information:  CM consulted for discharge planning. Pt has CNS inflammatory process  Being treated with IV Solu-Medrol. PT recommending ARU.  CM spoke with Carlito at [facility] [phone] he will review for clinical need, insurance check will need to be completed on Monday.      Met with pt at bedside to discuss discharge planning.  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  He said that he anticipates discharging to his his girlfriend (who is an RN) home at discharge.  We discussed benefit of ARU, pt agrees, referral was made to  ARU.  Girlfriend will provide transportation.     Aysha Olivia RN, BSN, PHN, CCM  Care Coordinator  [facility]  [phone]

## 2021-11-28 NOTE — PROGRESS NOTES
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 Pt is concerned about his ability to keep it going. Pt is IND for all ADLs and does not use an AD at baseline.   Disability/Function   Hearing Difficulty or Deaf no   Wear Glasses or Blind no   Difficulty Communicating no   Fall history within last six months no   General Information   Onset of Illness/Injury or Date of Surgery 11/26/21   Referring Physician Mj Lorenzo MD   Patient/Family Therapy Goals Statement (PT) Return home and back to work   Pertinent History of Current Problem (include personal factors and/or comorbidities that impact the POC) Pt is a 36 year old male male with a history of migraines, recently diagnosed CARLY and prescribed CPAP, paranoid schizophrenia (but on no medications) who was admitted on 11/26/2021 with 3 week hx of headache, ongoing fevers, bilateral LE weakness and now with increased difficulty ambulating.   Existing Precautions/Restrictions fall   Cognition   Orientation Status (Cognition) oriented x 4   Affect/Mental Status (Cognition) anxious   Follows Commands (Cognition) WNL   Pain Assessment   Patient Currently in Pain No   Posture    Posture Not impaired   Range of Motion (ROM)   ROM Comment AROM WNL.   Strength Comprehensive (MMT)   Comment, General Manual Muscle Testing (MMT) Assessment Bilateral hip and knee strength WNL. Minimal deficits noted with  bilateral ankle plantarflexion.   Bed Mobility   Bed Mobility No deficits identified   Comment (Bed Mobility) Pt is IND with bed mobility.   Transfers   Transfers sit-stand transfer   Sit-Stand Transfer   Sit-Stand Hall (Transfers) supervision   Assistive Device (Sit-Stand Transfers) walker, front-wheeled   Gait/Stairs (Locomotion)   Hall Level (Gait) contact guard   Assistive Device (Gait) walker, front-wheeled   Distance in Feet (Required for LE Total Joints) 75   Pattern (Gait) step-through;2-point   Deviations/Abnormal Patterns (Gait) base of support, narrow;ataxic   Balance   Balance Comments Pt has substantial standing balance deficits. Pt unable to maintain eyes open SLS without UE support on either leg, and pt's static eyes open bilateral stance balance is poor (katelin avitia).   Clinical Impression   Criteria for Skilled Therapeutic Intervention yes, treatment indicated   PT Diagnosis (PT) Balance and gait deficits   Influenced by the following impairments Bilateral LE numbness/sensory deficits   Functional limitations due to impairments Decreased functional IND with transfers, gait, and stairs.   Clinical Presentation Evolving/Changing   Clinical Presentation Rationale Pt's presentation has been day-to-day for the past few weeks and has progressively gotten worse.   Clinical Decision Making (Complexity) moderate complexity   Therapy Frequency (PT) Daily   Predicted Duration of Therapy Intervention (days/wks) 2-3 days   Planned Therapy Interventions (PT) balance training;gait training;stair training;transfer training   Anticipated Equipment Needs at Discharge (PT) walker, standard   Risk & Benefits of therapy have been explained evaluation/treatment results reviewed;care plan/treatment goals reviewed;participants voiced agreement with care plan;participants included;patient   PT Discharge Planning    PT Discharge Recommendation (DC Rec) Acute Rehab Center-Motivated patient will benefit  from intensive, interdisciplinary therapy.  Anticipate will be able to tolerate 3 hours of therapy per day;Transitional Care Facility;home with assist;home with home care physical therapy   PT Rationale for DC Rec Pt currently presents with severe gait and balance deficits secondary to sensory changes in bilateral LE and is highly motivated and would benefit from an intensive gait and balance training program in an ARU. If unable to get a placement, pt would benefit from 24/7 assistance and balance training in a TCU. If d/c home, pt would need 24/7 assistance for all mobility and would benefit from HHPT to improve balance deficits.   PT Brief overview of current status  assist x1 for all OOB mobility with a FWW   Total Evaluation Time   Total Evaluation Time (Minutes) 8

## 2021-11-28 NOTE — PROGRESS NOTES
Pt refusing to leave IV site intact and requesting it be removed again and replaced tomorrow prior to steroid infusion.  Educated patient on the things we could try to alleviate discomfort from IV- ie: warm pack/no no and patient still refusing to leave IV in. Also educated patient that taking the IV out would mean placing a new IV later and risking several attempts to get access- patient voiced understanding and ultimately IV removed.

## 2021-11-28 NOTE — PLAN OF CARE
"For Cares from [id number]  /69 (BP Location: Left arm)   Pulse 69   Temp 98.6  F (37  C) (Oral)   Resp 16   Ht 1.676 m (5' 6\")   Wt 69.2 kg (152 lb 8 oz)   SpO2 97%   BMI 24.61 kg/m      Orientation: A & 0 x 4  Neuro: numbness/sensation to BLE improved. Pt can wiggle toes, bend knees, and dorsiflex/plantarflex.  Up with PT ambulating in [place] today and per PT, pt has severe gait and balance deficits  Pain status: denies  Activity: up with assist of 1 and 4 wheel walker  Resp: LS clear and equal bilaterally, CPAP used overnight   Cardiac: WDL  GI: +BS all quadrants. Last BM 11/27  : WDL  Skin: WDL  LDA: L PIV placed around 1400 today for solumedrol infusion  Infusions: Q24H IV solumedrol   Pertinent Labs: see pending labs, awaiting cultures, per PA note: -->  - Lab work up thus far with low vitamin D level-pt refusing Vitamin D supplements,  ESR 16, positive strep throat swab as below,  - Negative thus far in the lab work up:  Blood cultures, CK, crp, HIV, Lyme violette, Influenza, COVID-19, HSV, TSH, B12, Treponema Violette  - Pending thus far in the lab work up: DS dna, SSB, SSA, ACE level, NMO antibodies, Anti MOG, CAMACHO  Diet: regular  Consults: neurology, PT/OT  Plan: Continue to follow up with lab results, continue IV solumedrol, PT/OT consults, neurology recommends continuing IV solumedrol - BG checks and sliding scale insulin added, continuing therapy, and adding a PPI for stomach protection, though pt refuse this morning.   "

## 2021-11-28 NOTE — PLAN OF CARE
"[id number]    Inpatient Progress Note:    /66 (BP Location: Left arm)   Pulse 70   Temp 97.2  F (36.2  C) (Axillary)   Resp 16   Ht 1.676 m (5' 6\")   Wt 69.2 kg (152 lb 8 oz)   SpO2 98%   BMI 24.61 kg/m       Orientation: alert and oriented x4  Neuro: numbness to BLE unchanged, pt states no better or worse since admission, able to wiggle toes, bend knees, and perform slight dorsiflexion/plantarflexion, able to lift legs into and out of bed and ambulate to bathroom and back - seems to be gaining strength  Pain status: denies  Activity: up with assist of 1  Resp: WDL, LS clear and equal bilaterally, CPAP used overnight   Cardiac: WDL  GI: BS active in all quadrants, large BM last evening following suppository, emesis x1 overnight - zofran administered with relief of nausea  : WDL  Skin: WDL  LDA: peripheral IV removed last evening d/t discomfort - pt refused placement of new IV site until IV solumedrol due - see previous note from Linda GALAVIZ  Infusions: Q24H IV solumedrol   Pertinent Labs: see pending labs, awaiting cultures, per PA note: -->  - Lab work up thus far with low vitamin D level, ESR 16, positive strep throat swab as below,  - Negative thus far in the lab work up:  Blood cultures, CK, crp, HIV, Lyme violette, Influenza, COVID-19, HSV, TSH, B12, Treponema Violette  - Pending thus far in the lab work up: DS dna, SSB, SSA, ACE level, NMO antibodies, Anti MOG, CAMACHO  Diet: regular  Consults: neurology, PT/OT  Discharge Plan: continue to follow up with lab results, continue IV solumedrol, PT/OT consults, neurology recommends continuing IV solumedrol - BG checks and sliding scale insulin added, continuing therapy, and adding a PPI for stomach protection, may need to consider IVIG after steroids completed if lack of improvement, monitor for neurogenic bladder    Will continue to monitor and provide cares.      Miguelina James, RN     "

## 2021-11-29 ENCOUNTER — APPOINTMENT (OUTPATIENT)
Dept: PHYSICAL THERAPY | Facility: CLINIC | Age: 36
End: 2021-11-29
Payer: COMMERCIAL

## 2021-11-29 LAB
ALB CSF/SERPL: 9.4 RATIO
ALBUMIN CSF-MCNC: 34 MG/DL
ALBUMIN SERPL-MCNC: 3630 MG/DL
ANA PAT SER IF-IMP: ABNORMAL
ANA SER QL IF: ABNORMAL
ANA TITR SER IF: ABNORMAL {TITER}
AQP4 H2O CHANNEL IGG SERPL QL IF: NORMAL
DSDNA AB SER-ACNC: <0.6 IU/ML
ENA SS-A AB SER IA-ACNC: 0.5 U/ML
ENA SS-A AB SER IA-ACNC: NEGATIVE
ENA SS-B IGG SER IA-ACNC: <0.6 U/ML
ENA SS-B IGG SER IA-ACNC: NEGATIVE
GLUCOSE BLDC GLUCOMTR-MCNC: 114 MG/DL (ref 70–99)
GLUCOSE BLDC GLUCOMTR-MCNC: 121 MG/DL (ref 70–99)
GLUCOSE BLDC GLUCOMTR-MCNC: 164 MG/DL (ref 70–99)
GLUCOSE BLDC GLUCOMTR-MCNC: 95 MG/DL (ref 70–99)
IGG CSF-MCNC: 8.5 MG/DL
IGG SERPL-MCNC: 1308 MG/DL
IGG SYNTH RATE SER+CSF CALC-MRATE: 10.7 MG/D
IGG/ALB CLEAR SER+CSF-RTO: 0.69 RATIO
IGG/ALB CSF: 0.25 RATIO
OLIGOCLONAL BANDS CSF ELPH-IMP: ABNORMAL
OLIGOCLONAL BANDS CSF ELPH-IMP: POSITIVE
OLIGOCLONAL BANDS CSF IEF: 2 BANDS

## 2021-11-29 PROCEDURE — 99232 SBSQ HOSP IP/OBS MODERATE 35: CPT | Performed by: HOSPITALIST

## 2021-11-29 PROCEDURE — 258N000003 HC RX IP 258 OP 636: Performed by: INTERNAL MEDICINE

## 2021-11-29 PROCEDURE — 99207 PR CDG-MDM COMPONENT: MEETS LOW - DOWN CODED: CPT | Performed by: HOSPITALIST

## 2021-11-29 PROCEDURE — 250N000013 HC RX MED GY IP 250 OP 250 PS 637: Performed by: PHYSICIAN ASSISTANT

## 2021-11-29 PROCEDURE — 250N000011 HC RX IP 250 OP 636: Performed by: INTERNAL MEDICINE

## 2021-11-29 PROCEDURE — 97530 THERAPEUTIC ACTIVITIES: CPT | Mod: GP | Performed by: PHYSICAL THERAPIST

## 2021-11-29 PROCEDURE — 120N000004 HC R&B MS OVERFLOW

## 2021-11-29 PROCEDURE — 97116 GAIT TRAINING THERAPY: CPT | Mod: GP | Performed by: PHYSICAL THERAPIST

## 2021-11-29 RX ORDER — BACLOFEN 10 MG/1
5 TABLET ORAL 3 TIMES DAILY
Status: DISCONTINUED | OUTPATIENT
Start: 2021-11-29 | End: 2021-11-30 | Stop reason: HOSPADM

## 2021-11-29 RX ADMIN — PANTOPRAZOLE SODIUM 40 MG: 40 TABLET, DELAYED RELEASE ORAL at 08:23

## 2021-11-29 RX ADMIN — SENNOSIDES AND DOCUSATE SODIUM 1 TABLET: 50; 8.6 TABLET ORAL at 19:37

## 2021-11-29 RX ADMIN — POLYETHYLENE GLYCOL 3350 17 G: 17 POWDER, FOR SOLUTION ORAL at 08:33

## 2021-11-29 RX ADMIN — SODIUM CHLORIDE 1000 MG: 9 INJECTION, SOLUTION INTRAVENOUS at 15:09

## 2021-11-29 RX ADMIN — SENNOSIDES AND DOCUSATE SODIUM 1 TABLET: 50; 8.6 TABLET ORAL at 08:23

## 2021-11-29 RX ADMIN — ONDANSETRON 4 MG: 2 INJECTION INTRAMUSCULAR; INTRAVENOUS at 15:08

## 2021-11-29 ASSESSMENT — ACTIVITIES OF DAILY LIVING (ADL)
ADLS_ACUITY_SCORE: 8
ADLS_ACUITY_SCORE: 5
ADLS_ACUITY_SCORE: 8
ADLS_ACUITY_SCORE: 8
ADLS_ACUITY_SCORE: 5

## 2021-11-29 NOTE — PLAN OF CARE
Temp: 98.9  F (37.2  C) Temp src: Oral BP: 118/72 Pulse: 76   Resp: 16 SpO2: 96 % O2 Device: None (Room air)       Pt up Ax1- refusing gait belt and walker while ambulating in room pt can be unsteady at times but overall gait is slowly improving. Pt also refusing to keep his IV in after completing the IV solu-medrol so new IV needs to be placed everyday for med administration. His biggest complaint is hiccups- provider notified and no new meds will be ordered at this time. Other than hiccups pt denies pain. Complains of nausea, refused zofran. Still bilateral numbness and tingling in lower extremities. PT recommending ARU. Plan- neurology following, labs still pending, IV solu-medrol daily, SW following for ARU placement.

## 2021-11-29 NOTE — PLAN OF CARE
"Pt up SBA. Much steadier today. Pt denies numbness in his legs, now states just some tingling. Pt also refusing to keep his IV in after completing the IV solu-medrol so he would prefer new IV to be placed everyday at 2pm and immediately removed. Hiccups continue, denies pain. Complains of nausea, refused PO zofran because \"it doesn't work.\" Unfortunately, no IV site so nausea persists.  PT recommending ARU. Plan unknown, pt is talking about needing to leave for a couple hours.- neurology following, labs still pending, IV solu-medrol daily, SW following for ARU placement.   "

## 2021-11-29 NOTE — PLAN OF CARE
"[id number]    Inpatient Progress Note:    /63 (BP Location: Left arm)   Pulse 85   Temp 98.6  F (37  C) (Oral)   Resp 18   Ht 1.676 m (5' 6\")   Wt 69.2 kg (152 lb 8 oz)   SpO2 96%   BMI 24.61 kg/m       Orientation: alert and oriented x4, irritable at times but cooperative  Neuro: lower extremity weakness improved, numbness now intermittent vs constant, able to wiggle toes, bend knees, perform dorsiflexion/plantarflexion, able to lift legs into and out of bed on own, ambulating to bathroom, pt states he feels a lot of improvement   Pain status: denies  Activity: up with assist x1, refusing to use gait belt or walker  Resp: WDL, LS clear and equal bilaterally, CPAP use overnight  Cardiac: WDL  GI: BS active in all quadrants, patient requesting suppository with AM medications, intermittent nausea overnight but declines antiemetic   : WDL  Skin: WDL  LDA: no IV access, pt refused - see previous RN note  Infusions: Q24H IV solumedrol  Pertinent Labs:   Diet: regular  Consults: PT/OT, neurology  Discharge Plan: continue to follow up with lab results, continue IV solumedrol, PT/OT consults, neurology recommends continuing IV solumedrol - BG checks and sliding scale insulin added, therapy, and PPI for stomach protection, may need to consider IVIG after steroids completed IF lack of improvement, monitor for neurogenic bladder    Will continue to monitor and provide cares.     Miguelina James, RN         "

## 2021-11-29 NOTE — PROGRESS NOTES
[facility]  Internal Medicine  Progress Note    Date of Service: 11/29/2021    Patient: Akash James  MRN: [id number]  Admission Date: 11/26/2021  Hospital Day # 4    Assessment & Plan: Akash James is a 36 year old male male with a history of migraines, recently diagnosed CARLY and prescribed CPAP, paranoid schizophrenia (but not on medications) who was admitted on 11/26/2021 with 3 week hx of headache, ongoing fevers, bilateral LE weakness and now with inability to walk.     He was seen in the ER 11/11/21 for 5 day hx of fevers/cough/bodyaches/headache/nausea but no vomiting.  His temps were as high as 103.  COVID negative as was other workup, and he was discharged home.  Since that time he has had ongoing poor po intake, 15 pound weight loss, headache, bilateral numbness/weakness of his LE has led to his inability to walk on his own. Found to have MRI findings concerning for ADEM vs NMOSD vs other demyelinating/autoimmune pathology.    Numbness/weakness due to demyelination/autoimmune disease    -  MRI of the brain and spinal cord revealed diffuse inflammatory process concerning for multiple sclerosis or neuromyelitis optica versus much less likely infectious etiology    - per Neurology, concern for ADEM vs NMOSD vs other demyelinating/autoimmune pathology    - possibly a pos-viral event (see below)    - MRI orbits negative    - Neurology recommending Solu-Medrol 1 g IV daily for 5 days (currently day 4)    - +/- plasmapheresis depending on response     - further recs per Neurology    - PT has seen patient and recommends ARU (I did inform social work)     Strep throat     - strep positive 11/26 s/p single dose of IM pcn G    - asymptomatic.     CARLY     - continue cpap     Schizophrenia     - not currently not on medications     Constipation, resolved     - had a BM overnight.  Continue scheduled Senna S and prn Miralax and enema.    Patient is concerned about an issue with his landlord that  he needs to take care of.  He needs to return some keys to his landlord, otherwise he will be charged for another month's rent.  He feels that he needs to be the one to take care of this, because the relationship with the landlord is not good and he feels that he is discriminated against.  I offered to write him a letter or call the landlord to explain that he is in the hospital.  Patient states that this would not work with this particular person.  I also ask if there is any family members or friends that could take the keys for him.  He states that this would not work either.  He states that he needs to be the one to give the keys to the landlord in order to ensure there are no complications.  Patient was not willing to leave against medical with all advice and then come back to the emergency room.  I did not feel like this was a good use of our resources given the current pandemic and her bed issues.  I did speak with my nurse manager who spoke with our risk management department.  Apparently, I am able to allow him to leave to get this test done.  I have indicated to him that he would be responsible for anything that would happen to him in this period of time.  His sister is in the room and is a witness to this.  I indicated that we are not done treating him medically and that he should not be leaving the hospital.  I indicated that I understood his situation and could allow him to leave temporarily at his own risk.  He states he understood this.  His sister confirmed that she also understood this.  He has been given the card for the observation unit so that he has this phone number.  He was told that he will be allowed to leave at 8 PM, and that he must return by 10 PM tonight.  He understood this.    CODE: full  DVT: scd  GI: Protonix  Diet/fluids: regular    Subjective & Interval Hx:    Patient reports he had a BM yesterday.  Denies any pain today.  Feels fatigued.  Tolerating po well.  Reports increased  "strength and mobility in his BLE    Last 24 hr care team notes reviewed.   ROS:  4 point ROS including Respiratory, CV, GI and , other than that noted in the HPI, is negative.    Physical Exam:    Blood pressure 117/66, pulse 64, temperature 98.4  F (36.9  C), temperature source Oral, resp. rate 18, height 1.676 m (5' 6\"), weight 69.2 kg (152 lb 8 oz), SpO2 97 %.  General: Alert, interactive, NAD  HEENT: AT/NC  Resp: clear to auscultation bilaterally, no crackles or wheezes  Cardiac: regular rate and rhythm, no murmur  Abdomen: Soft, nontender, nondistended. +BS.  Extremities: able to bend his bilateral knees.  Able to straight leg raise today ~15-20 degrees.  BUE 5/5 strength  Skin: Warm and dry  Neuro: Alert & oriented x 3    Labs & Images:  Reviewed in Epic   Medications:    Current Facility-Administered Medications   Medication     acetaminophen (TYLENOL) tablet 650 mg    Or     acetaminophen (TYLENOL) Suppository 650 mg     alum & mag hydroxide-simethicone (MAALOX) suspension 30 mL     baclofen (LIORESAL) half-tab 5 mg     bisacodyl (DULCOLAX) Suppository 10 mg     cholecalciferol (VITAMIN D3) 125 mcg (5000 units) capsule 1,250 mcg     cyanocobalamin (VITAMIN B-12) tablet 1,000 mcg     glucose gel 15-30 g    Or     dextrose 50 % injection 25-50 mL    Or     glucagon injection 1 mg     insulin aspart (NovoLOG) injection (RAPID ACTING)     insulin aspart (NovoLOG) injection (RAPID ACTING)     lidocaine (LMX4) cream     lidocaine 1 % 0.1-1 mL     magnesium hydroxide (MILK OF MAGNESIA) suspension 30 mL     melatonin tablet 3 mg     methylPREDNISolone sodium succinate (solu-MEDROL) 1,000 mg in sodium chloride 0.9 % 266 mL intermittent infusion     ondansetron (ZOFRAN-ODT) ODT tab 4 mg    Or     ondansetron (ZOFRAN) injection 4 mg     pantoprazole (PROTONIX) EC tablet 40 mg     polyethylene glycol (MIRALAX) Packet 17 g     senna-docusate (SENOKOT-S/PERICOLACE) 8.6-50 MG per tablet 1 tablet     senna-docusate " (SENOKOT-S/PERICOLACE) 8.6-50 MG per tablet 1 tablet    Or     senna-docusate (SENOKOT-S/PERICOLACE) 8.6-50 MG per tablet 2 tablet     sodium chloride (PF) 0.9% PF flush 3 mL     sodium chloride (PF) 0.9% PF flush 3 mL     sodium chloride (PF) 0.9% PF flush 3 mL     sodium phosphate (FLEET ENEMA) 1 enema

## 2021-11-30 ENCOUNTER — APPOINTMENT (OUTPATIENT)
Dept: PHYSICAL THERAPY | Facility: CLINIC | Age: 36
End: 2021-11-30
Payer: COMMERCIAL

## 2021-11-30 VITALS
TEMPERATURE: 98.1 F | DIASTOLIC BLOOD PRESSURE: 66 MMHG | SYSTOLIC BLOOD PRESSURE: 119 MMHG | WEIGHT: 152.5 LBS | BODY MASS INDEX: 24.51 KG/M2 | OXYGEN SATURATION: 97 % | HEIGHT: 66 IN | RESPIRATION RATE: 16 BRPM | HEART RATE: 70 BPM

## 2021-11-30 LAB
B BURGDOR IGG CSF QL IB: NEGATIVE
B BURGDOR IGM CSF QL IB: NEGATIVE
GLUCOSE BLDC GLUCOMTR-MCNC: 104 MG/DL (ref 70–99)
GLUCOSE BLDC GLUCOMTR-MCNC: 150 MG/DL (ref 70–99)

## 2021-11-30 PROCEDURE — 250N000013 HC RX MED GY IP 250 OP 250 PS 637: Performed by: PHYSICIAN ASSISTANT

## 2021-11-30 PROCEDURE — 99239 HOSP IP/OBS DSCHRG MGMT >30: CPT | Performed by: HOSPITALIST

## 2021-11-30 PROCEDURE — 250N000013 HC RX MED GY IP 250 OP 250 PS 637: Performed by: HOSPITALIST

## 2021-11-30 PROCEDURE — 97116 GAIT TRAINING THERAPY: CPT | Mod: GP

## 2021-11-30 PROCEDURE — 250N000011 HC RX IP 250 OP 636: Performed by: INTERNAL MEDICINE

## 2021-11-30 PROCEDURE — 250N000013 HC RX MED GY IP 250 OP 250 PS 637: Performed by: PSYCHIATRY & NEUROLOGY

## 2021-11-30 PROCEDURE — 258N000003 HC RX IP 258 OP 636: Performed by: INTERNAL MEDICINE

## 2021-11-30 PROCEDURE — 97750 PHYSICAL PERFORMANCE TEST: CPT | Mod: GP

## 2021-11-30 RX ORDER — AMOXICILLIN 250 MG
1 CAPSULE ORAL 2 TIMES DAILY PRN
Qty: 60 TABLET | Refills: 1 | Status: SHIPPED | OUTPATIENT
Start: 2021-11-30

## 2021-11-30 RX ORDER — POLYETHYLENE GLYCOL 3350 17 G/17G
17 POWDER, FOR SOLUTION ORAL DAILY
Qty: 510 G | Refills: 1 | Status: SHIPPED | OUTPATIENT
Start: 2021-11-30

## 2021-11-30 RX ADMIN — BACLOFEN 5 MG: 10 TABLET ORAL at 08:07

## 2021-11-30 RX ADMIN — POLYETHYLENE GLYCOL 3350 17 G: 17 POWDER, FOR SOLUTION ORAL at 15:19

## 2021-11-30 RX ADMIN — SENNOSIDES AND DOCUSATE SODIUM 1 TABLET: 50; 8.6 TABLET ORAL at 08:07

## 2021-11-30 RX ADMIN — CYANOCOBALAMIN TAB 1000 MCG 1000 MCG: 1000 TAB at 08:07

## 2021-11-30 RX ADMIN — SODIUM CHLORIDE 1000 MG: 9 INJECTION, SOLUTION INTRAVENOUS at 13:59

## 2021-11-30 ASSESSMENT — ACTIVITIES OF DAILY LIVING (ADL)
ADLS_ACUITY_SCORE: 6
ADLS_ACUITY_SCORE: 5
ADLS_ACUITY_SCORE: 6
ADLS_ACUITY_SCORE: 5
ADLS_ACUITY_SCORE: 6
ADLS_ACUITY_SCORE: 5
ADLS_ACUITY_SCORE: 6
ADLS_ACUITY_SCORE: 5
ADLS_ACUITY_SCORE: 6

## 2021-11-30 NOTE — PLAN OF CARE
"AOx4. Up SBA. PT following- recommending ARU-no bed available at this time- Ok'd to discontinue home with home PT. Weakness/numbness in BLE improved-reports a light tingling in BLE that is intermittent o/w denies. Final dose solu medrol and then discharge today. /76 (BP Location: Left arm)   Pulse 65   Temp 98.4  F (36.9  C) (Oral)   Resp 20   Ht 1.676 m (5' 6\")   Wt 69.2 kg (152 lb 8 oz)   SpO2 97%   BMI 24.61 kg/m     "

## 2021-11-30 NOTE — DISCHARGE SUMMARY
[facility]  Hospitalist Discharge Summary      Date of Admission:  11/26/2021  Date of Discharge:  11/30/2021  Discharging Provider: Devante Ramirez MD      Discharge Diagnoses   Lower extremity loss of sensation and weakness. 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 Asymptomatic strep pharyngitis    Follow-ups Needed After Discharge   Follow-up Appointments     Follow-up and recommended labs and tests       Follow up with Neurology, Dr. Daniela Saez    [facility]    [facility]  [address] [place] [id number]  [phone]    Your appt is on December 9th at 2pm             Unresulted Labs Ordered in the Past 30 Days of this Admission     Date and Time Order Name Status Description    11/26/2021  9:17 PM MOGFS -  anti- MOG  (myelin oligodendrocyte) antibody: [facility] Miscellaneous Test In process     11/26/2021  4:52 PM NMOFS -  NMO (aquaporin 4 antibody) - Proctor send out - needed -: [facility]ctor Miscellaneous Test In process     11/26/2021  4:20 AM Viral Culture Non-respiratory Preliminary     11/26/2021  4:17 AM Cerebrospinal fluid Aerobic Bacterial Culture Routine Preliminary     11/26/2021  4:08 AM Blood Culture Hand, Right Preliminary     11/26/2021  1:27 AM Blood Culture Peripheral Blood Preliminary       These results will be followed up by Neurology    Discharge Disposition   Discharged to home  Condition at discharge: Stable      Hospital Course   Akash James is a 36 year old male with a history of migraines, ? Diagnosis of gonorrhea (I am unable to locate any positive test), recently diagnosed CARLY and prescribed CPAP, paranoid schizophrenia (but on no medications),  admitted on 11/26/2021  with 3 week hx of headache, ongoing fevers, n/v/d, bilateral LE weakness and now with inability to walk and SO is unable to care for patient     The patient received some metoclopramide for his headache which has  unfortunately left him quite somnolent, I tried to call his emergency contact although that number was disconnected. I tried multiple times to waken patient which he will do but then goes right back to sleep      He was seen in the ER 11/11/21 for 5 day hx of fevers/cough/bodyaches/headache/nausea but no vomiting.  His temps were as high as 103.  COVID negative as was other workup, and he was discharged home.      Since that time he has had ongoing n/v/d, poor po and a reported 15 pound weight loss.  He states his headache is from his forehead to his occiput but spares the sides of his heads.  He has found nothing that helps it improve, he is however able to sleep but when he wakes it is still present.  No vision changes per discussion with ER.  It's not clear to me if the n/v is related to the headaches or a GI process,  He is photophobic.  Headache worsens with bending over but he denies any sinus pressure.  His bilateral numbness/weakness of his LE has led to his inability to walk on his own.  His SO brought him into the ER for evaluation because she is unable to meet his needs at home.    Numbness/weakness due to demyelination/autoimmune disease    -  MRI of the brain and spinal cord revealed diffuse inflammatory process concerning for multiple sclerosis or neuromyelitis optica versus much less likely infectious etiology    - per Neurology, concern for ADEM vs NMOSD vs other demyelinating/autoimmune pathology    - possibly a pos-viral event (see below)    - MRI orbits negative    - Neurology recommending Solu-Medrol 1 g IV daily for 5 days (currently day 4)    - +/- plasmapheresis depending on response     - further recs per Neurology    - PT has seen patient and recommends ARU (I did inform social work)      Strep throat     - strep positive 11/26 s/p single dose of IM pcn G    - asymptomatic.     CARLY     - continue cpap     Schizophrenia     - not currently not on medications     Constipation, resolved     -  had a BM overnight.  Continue scheduled Senna S and prn Miralax and enema.    Patient was seen today.  He has improved each day.  He worked with physical therapy again today.  He has improved from yesterday.  He is still needing a walker.  He still seems at this time to be appropriate for an acute rehab admission.  He has completed his high-dose IV steroid course today.  I personally spoke with Dr. Carbone from neurology.  He would not recommend any plasmapheresis at this time given that the patient has improved so much.  I also spoke with the acute rehab liaison.  There are no beds at acute rehab for today or for tomorrow.  They may have a bed on Thursday, however they feel that the patient might be improved by then to the point of not needing acute rehab.  I discussed all this with the patient.  I indicated that his medical interventions are complete today.  I indicated that there were no beds at acute rehab till at least Thursday.  I also informed him of the potential of still not having a bed and or not needing acute rehab at that time.  I stated I could send him home and order outpatient physical therapy.  I left the decision up to the patient.  I definitely do indicate that he would benefit from acute rehab, but that he would have to stay here and be reassessed on Thursday.  Patient has decided to discharge home.  He will follow up with Dr. Saez of neurology.  I did call her office and confirmed that he has an appointment on December 9.  Also, the patient had hiccups yesterday for which I started baclofen.  This has resolved.  I do not think he needs any this medication to go home with.  He does need some bowel meds which will be filled.    Consultations This Hospital Stay   NEUROLOGY IP CONSULT  PHYSICAL THERAPY ADULT IP CONSULT  OCCUPATIONAL THERAPY ADULT IP CONSULT  CARE MANAGEMENT / SOCIAL WORK IP CONSULT    Code Status   Full Code    Time Spent on this Encounter   I, Devante Ramirez MD, personally saw  the patient today and spent greater than 30 minutes discharging this patient.       Devante Ramirez MD  [facility] OBSERVATION DEPT  [address] [place] [address]  Phone: [phone]  ______________________________________________________________________    Physical Exam   Vital Signs: Temp: 98.4  F (36.9  C) Temp src: Oral BP: 130/76 Pulse: 65   Resp: 20 SpO2: 97 % O2 Device: None (Room air)    Weight: 152 lbs 8 oz  Constitutional: awake, alert, cooperative, no apparent distress, and appears stated age  Eyes: Lids and lashes normal, pupils equal, round and reactive to light, extra ocular muscles intact, sclera clear, conjunctiva normal  ENT: Normocephalic, without obvious abnormality, atraumatic, sinuses nontender on palpation, external ears without lesions, oral pharynx with moist mucous membranes, tonsils without erythema or exudates, gums normal and good dentition.  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     Follow-up and recommended labs and tests     Follow up with Neurology, Dr. Daniela Saez    [facility]    [facility]  [address] [place] [address] [phone]    Your appt is on December 9th at 2pm     Activity    Your activity upon discharge: activity as tolerated     Walker Order    DME Documentation:   Describe the reason for need  to support medical necessity: impaired balance and gait.     I, the undersigned, certify that the above prescribed supplies are medically necessary for this patient and is both reasonable and necessary in reference to accepted standards of medical and necessary in reference to accepted standards of medical practice in the treatment of this patient's condition and is not prescribed as a convenience.     Diet    Follow this diet upon discharge: Orders Placed This Encounter      Advance Diet as Tolerated: Regular Diet Adult; Regular Diet Adult       Significant Results and Procedures   Most Recent 3 CBC's:Recent Labs   Lab Test 11/27/21  0705 11/26/21 0146   WBC 4.2 4.0   HGB 14.4 14.8   MCV 91 90    320     Most Recent 3 BMP's:Recent Labs   Lab Test 11/30/21  1358 11/30/21  0738 11/29/21  2340 11/27/21  1409 11/27/21  0705 11/26/21  0148   NA  --   --   --   --  136 132*   POTASSIUM  --   --   --   --  4.1 3.9   CHLORIDE  --   --   --   --  104 99   CO2  --   --   --   --  27 30   BUN  --   --   --   --  11 9   CR  --   --   --   --  0.93 1.05   ANIONGAP  --   --   --   --  5 3   SHAYNA  --   --   --   --  9.3 9.0   * 150* 164*   < > 126* 112*    < > = values in this interval not displayed.     Most Recent 2 LFT's:Recent Labs   Lab Test 11/26/21 0148   AST 12   ALT 15   ALKPHOS 43   BILITOTAL 0.5   ,   Results for orders placed or performed during the hospital encounter of 11/26/21   XR Chest 2 Views    Narrative    EXAM: XR CHEST 2 VW  LOCATION: [facility]  DATE/TIME: 11/26/2021 2:31 AM    INDICATION: Fever  COMPARISON: 11/11/2021      Impression    IMPRESSION: Heart size is normal. Lungs remain clear. No visible pneumothorax.   Head CT w/o contrast    Narrative    EXAM: CT HEAD W/O CONTRAST  LOCATION: [facility]  DATE/TIME: 11/26/2021 2:23 AM    INDICATION: Headache, intracranial hemorrhage suspected  COMPARISON: 09/12/2008  TECHNIQUE: Routine CT Head  without IV contrast. Multiplanar reformats. Dose reduction techniques were used.    FINDINGS:  INTRACRANIAL CONTENTS: No intracranial hemorrhage, extraaxial collection, or mass effect.  No CT evidence of acute infarct. Normal parenchymal attenuation. Normal ventricles and sulci.     VISUALIZED ORBITS/SINUSES/MASTOIDS: No intraorbital abnormality. No paranasal sinus mucosal disease. No middle ear or mastoid effusion.    BONES/SOFT TISSUES: No acute abnormality.      Impression    IMPRESSION:  1.  No acute intracranial process.   MR Brain w/o & w Contrast    Narrative    MR BRAIN WITHOUT AND WITH CONTRAST   MR CERVICAL SPINE WITHOUT AND WITH CONTRAST   MR LUMBAR SPINE WITHOUT AND WITH CONTRAST   MR THORACIC SPINE WITHOUT AND WITH CONTRAST  11/26/2021 10:52 AM    INDICATION: Multiple sclerosis, new event. Neurological deficit,  acute, stroke suspected; headache, fever, leg weakness.    TECHNIQUE:   1. Brain MRI with and without IV contrast.  2. Cervical spine MRI with and without IV contrast.  3. Thoracic spine MRI without and with IV contrast.  4. Lumbar spine MRI without and with IV contrast.    CONTRAST: 8 mL Gadavist  COMPARISON: 9/12/2008 brain MRI.    FINDINGS:    HEAD MRI:  No finding for acute infarct, intracranial hemorrhage, or extra-axial  collection. Interval development of a T2/FLAIR hyperintensity in the  ventral right medulla oblongata with a small focus of left lateral  involvement and dorsal involvement extending into the inferior  cerebellar peduncles bilaterally. This is accompanied by a small  amount of T2 hyperintensity in the medial cerebellar hemispheres  bilaterally with involvement of the lateral margins of the fourth  ventricle. Additional punctate focus of T2 hyperintensity along the  ventral margin of the fourth ventricle, seen to the left of midline.  This is accompanied by a small amount of parenchymal enhancement along  the ventral left lateral margin of the fourth ventricle and the  medial  aspect of the bilateral cerebellar hemispheres, with parenchymal and  leptomeningeal enhancement at the medulla extending to the  cervicomedullary junction.    No additional parenchymal signal abnormality elsewhere. Normal  ventricular size and configuration with no parenchymal volume loss.  Major intracranial vascular flow voids are preserved. Cerebellar  tonsils are normally positioned.    Trace mucosal thickening in the ethmoid air cells. No fluid in the  mastoid air cells. Unremarkable orbits. Remainder negative.    Cervical spine MRI:  Straightening of the usual cervical lordosis with otherwise  unremarkable alignment. Maintained vertebral body heights.  Unremarkable marrow signal. No osseous edema. No extraspinal  abnormality. Disc space heights are preserved at all levels. Small  posterior disc osteophyte complex at C5-C6 with no significant  posterior disc osteophyte complex elsewhere. No significant facet  arthropathy. Mild C5-C6 spinal canal stenosis. No significant  neuroforaminal stenosis at any level. Expansile T2 hyperintense cord  signal abnormality extending from the C4 level to the C6-C7 level,  with a rounded focus of enhancement at the C4 level measuring 8 mm  craniocaudal and 4 mm AP, and a rounded focus of enhancement at the  C5-C6 level measuring 13 mm cranial caudal and 8 mm AP.    Thoracic spine MRI:  Unremarkable alignment. Maintained vertebral body heights.  Unremarkable marrow signal. No osseous edema. No abnormal enhancement.  Disc heights are preserved at all levels. Disc desiccation at the  T10-T11 level with only mild loss of disc signal. No posterior disc  bulge. No significant facet arthropathy. No significant spinal canal  or neuroforaminal stenosis at any level. T2/STIR hyperintense cord  signal change at the T10 level measures up to 2.7 cm craniocaudal,  with an enhancing focus that measures 1.9 cm craniocaudal. No  additional cord signal abnormality elsewhere. No  additional foci of  abnormal enhancement. Remainder negative.    Lumbar spine MRI: Numbering shows 5 lumbar type vertebrae.  Unremarkable alignment. Maintained vertebral body heights.  Unremarkable marrow signal with no osseous edema or concerning marrow  signal abnormality. No significant degenerative change in the lumbar  spine with no high-grade spinal canal or neuroforaminal stenosis at  any level. Conus tip is at the L1-L2 level. No abnormal enhancement or  thickening of the cauda equina nerve roots. Visualized bony pelvis is  unremarkable. Remainder negative.      Impression    IMPRESSION:   Head MRI:  1.  Interval development of a T2/FLAIR hyperintensity involving the  medulla, the medial aspect of the cerebellar hemispheres, and the  dorsal dhaval along the anterior margin of the fourth ventricle just to  the left of midline. There is an accompanying parenchymal enhancement  in these areas, with suggestion of a small amount of leptomeningeal  enhancement at the cervicomedullary junction. Given the submitted  clinical history, this raises concern for active demyelination in the  setting of ADEM or multiple sclerosis. Anti-MOG antibody disease could  generate a similar appearance. Given the possibility of a small amount  of leptomeningeal enhancement, consideration could be given towards  meningitis or possible sarcoid in the appropriate clinical setting.    Cervical spine MRI:  1.  Expansile T2 hyperintense cord signal abnormality extending from  the inferior C4 to the superior C7 level with rounded foci of  accompanying enhancement at the C4 and C5-C6 levels. This raises  concern for transverse myelitis, with the overall clinical picture  suggesting the possibility of ADEM (or possible Anti-MOG antibody  disease) with cord involvement. Active demyelination in the setting of  multiple sclerosis should also be considered. Infectious transverse  myelitis is thought less likely but provides additional  consideration.    Thoracic spine MRI:  1.  Enhancing T2/STIR hyperintense cord lesion at the T10 level  measuring up to 2.7 cm craniocaudal. No additional cord signal  abnormality or abnormal cord enhancement elsewhere. Differential  considerations as outlined in the cervical spine MRI impression still  apply.    Lumbar spine MRI:  1.  No abnormal enhancement in the cauda equina nerve roots or  visualized cord signal abnormality in the visualized inferior aspect  of the spinal cord.    2.  No significant degenerative change in the lumbar spine. Spinal  canal and neuroforamina are widely patent.    JESSI GRAVES MD         SYSTEM ID:  [id number]   MR Cervical Spine w/o & w Contrast    Narrative    MR BRAIN WITHOUT AND WITH CONTRAST   MR CERVICAL SPINE WITHOUT AND WITH CONTRAST   MR LUMBAR SPINE WITHOUT AND WITH CONTRAST   MR THORACIC SPINE WITHOUT AND WITH CONTRAST  11/26/2021 10:52 AM    INDICATION: Multiple sclerosis, new event. Neurological deficit,  acute, stroke suspected; headache, fever, leg weakness.    TECHNIQUE:   1. Brain MRI with and without IV contrast.  2. Cervical spine MRI with and without IV contrast.  3. Thoracic spine MRI without and with IV contrast.  4. Lumbar spine MRI without and with IV contrast.    CONTRAST: 8 mL Gadavist  COMPARISON: 9/12/2008 brain MRI.    FINDINGS:    HEAD MRI:  No finding for acute infarct, intracranial hemorrhage, or extra-axial  collection. Interval development of a T2/FLAIR hyperintensity in the  ventral right medulla oblongata with a small focus of left lateral  involvement and dorsal involvement extending into the inferior  cerebellar peduncles bilaterally. This is accompanied by a small  amount of T2 hyperintensity in the medial cerebellar hemispheres  bilaterally with involvement of the lateral margins of the fourth  ventricle. Additional punctate focus of T2 hyperintensity along the  ventral margin of the fourth ventricle, seen to the left of midline.  This is  accompanied by a small amount of parenchymal enhancement along  the ventral left lateral margin of the fourth ventricle and the medial  aspect of the bilateral cerebellar hemispheres, with parenchymal and  leptomeningeal enhancement at the medulla extending to the  cervicomedullary junction.    No additional parenchymal signal abnormality elsewhere. Normal  ventricular size and configuration with no parenchymal volume loss.  Major intracranial vascular flow voids are preserved. Cerebellar  tonsils are normally positioned.    Trace mucosal thickening in the ethmoid air cells. No fluid in the  mastoid air cells. Unremarkable orbits. Remainder negative.    Cervical spine MRI:  Straightening of the usual cervical lordosis with otherwise  unremarkable alignment. Maintained vertebral body heights.  Unremarkable marrow signal. No osseous edema. No extraspinal  abnormality. Disc space heights are preserved at all levels. Small  posterior disc osteophyte complex at C5-C6 with no significant  posterior disc osteophyte complex elsewhere. No significant facet  arthropathy. Mild C5-C6 spinal canal stenosis. No significant  neuroforaminal stenosis at any level. Expansile T2 hyperintense cord  signal abnormality extending from the C4 level to the C6-C7 level,  with a rounded focus of enhancement at the C4 level measuring 8 mm  craniocaudal and 4 mm AP, and a rounded focus of enhancement at the  C5-C6 level measuring 13 mm cranial caudal and 8 mm AP.    Thoracic spine MRI:  Unremarkable alignment. Maintained vertebral body heights.  Unremarkable marrow signal. No osseous edema. No abnormal enhancement.  Disc heights are preserved at all levels. Disc desiccation at the  T10-T11 level with only mild loss of disc signal. No posterior disc  bulge. No significant facet arthropathy. No significant spinal canal  or neuroforaminal stenosis at any level. T2/STIR hyperintense cord  signal change at the T10 level measures up to 2.7 cm  craniocaudal,  with an enhancing focus that measures 1.9 cm craniocaudal. No  additional cord signal abnormality elsewhere. No additional foci of  abnormal enhancement. Remainder negative.    Lumbar spine MRI: Numbering shows 5 lumbar type vertebrae.  Unremarkable alignment. Maintained vertebral body heights.  Unremarkable marrow signal with no osseous edema or concerning marrow  signal abnormality. No significant degenerative change in the lumbar  spine with no high-grade spinal canal or neuroforaminal stenosis at  any level. Conus tip is at the L1-L2 level. No abnormal enhancement or  thickening of the cauda equina nerve roots. Visualized bony pelvis is  unremarkable. Remainder negative.      Impression    IMPRESSION:   Head MRI:  1.  Interval development of a T2/FLAIR hyperintensity involving the  medulla, the medial aspect of the cerebellar hemispheres, and the  dorsal dhaval along the anterior margin of the fourth ventricle just to  the left of midline. There is an accompanying parenchymal enhancement  in these areas, with suggestion of a small amount of leptomeningeal  enhancement at the cervicomedullary junction. Given the submitted  clinical history, this raises concern for active demyelination in the  setting of ADEM or multiple sclerosis. Anti-MOG antibody disease could  generate a similar appearance. Given the possibility of a small amount  of leptomeningeal enhancement, consideration could be given towards  meningitis or possible sarcoid in the appropriate clinical setting.    Cervical spine MRI:  1.  Expansile T2 hyperintense cord signal abnormality extending from  the inferior C4 to the superior C7 level with rounded foci of  accompanying enhancement at the C4 and C5-C6 levels. This raises  concern for transverse myelitis, with the overall clinical picture  suggesting the possibility of ADEM (or possible Anti-MOG antibody  disease) with cord involvement. Active demyelination in the setting of  multiple  sclerosis should also be considered. Infectious transverse  myelitis is thought less likely but provides additional consideration.    Thoracic spine MRI:  1.  Enhancing T2/STIR hyperintense cord lesion at the T10 level  measuring up to 2.7 cm craniocaudal. No additional cord signal  abnormality or abnormal cord enhancement elsewhere. Differential  considerations as outlined in the cervical spine MRI impression still  apply.    Lumbar spine MRI:  1.  No abnormal enhancement in the cauda equina nerve roots or  visualized cord signal abnormality in the visualized inferior aspect  of the spinal cord.    2.  No significant degenerative change in the lumbar spine. Spinal  canal and neuroforamina are widely patent.    JESSI GRAVES MD         SYSTEM ID:  [id number]   MR Thoracic Spine w/o & w Contrast    Narrative    MR BRAIN WITHOUT AND WITH CONTRAST   MR CERVICAL SPINE WITHOUT AND WITH CONTRAST   MR LUMBAR SPINE WITHOUT AND WITH CONTRAST   MR THORACIC SPINE WITHOUT AND WITH CONTRAST  11/26/2021 10:52 AM    INDICATION: Multiple sclerosis, new event. Neurological deficit,  acute, stroke suspected; headache, fever, leg weakness.    TECHNIQUE:   1. Brain MRI with and without IV contrast.  2. Cervical spine MRI with and without IV contrast.  3. Thoracic spine MRI without and with IV contrast.  4. Lumbar spine MRI without and with IV contrast.    CONTRAST: 8 mL Gadavist  COMPARISON: 9/12/2008 brain MRI.    FINDINGS:    HEAD MRI:  No finding for acute infarct, intracranial hemorrhage, or extra-axial  collection. Interval development of a T2/FLAIR hyperintensity in the  ventral right medulla oblongata with a small focus of left lateral  involvement and dorsal involvement extending into the inferior  cerebellar peduncles bilaterally. This is accompanied by a small  amount of T2 hyperintensity in the medial cerebellar hemispheres  bilaterally with involvement of the lateral margins of the fourth  ventricle. Additional punctate  focus of T2 hyperintensity along the  ventral margin of the fourth ventricle, seen to the left of midline.  This is accompanied by a small amount of parenchymal enhancement along  the ventral left lateral margin of the fourth ventricle and the medial  aspect of the bilateral cerebellar hemispheres, with parenchymal and  leptomeningeal enhancement at the medulla extending to the  cervicomedullary junction.    No additional parenchymal signal abnormality elsewhere. Normal  ventricular size and configuration with no parenchymal volume loss.  Major intracranial vascular flow voids are preserved. Cerebellar  tonsils are normally positioned.    Trace mucosal thickening in the ethmoid air cells. No fluid in the  mastoid air cells. Unremarkable orbits. Remainder negative.    Cervical spine MRI:  Straightening of the usual cervical lordosis with otherwise  unremarkable alignment. Maintained vertebral body heights.  Unremarkable marrow signal. No osseous edema. No extraspinal  abnormality. Disc space heights are preserved at all levels. Small  posterior disc osteophyte complex at C5-C6 with no significant  posterior disc osteophyte complex elsewhere. No significant facet  arthropathy. Mild C5-C6 spinal canal stenosis. No significant  neuroforaminal stenosis at any level. Expansile T2 hyperintense cord  signal abnormality extending from the C4 level to the C6-C7 level,  with a rounded focus of enhancement at the C4 level measuring 8 mm  craniocaudal and 4 mm AP, and a rounded focus of enhancement at the  C5-C6 level measuring 13 mm cranial caudal and 8 mm AP.    Thoracic spine MRI:  Unremarkable alignment. Maintained vertebral body heights.  Unremarkable marrow signal. No osseous edema. No abnormal enhancement.  Disc heights are preserved at all levels. Disc desiccation at the  T10-T11 level with only mild loss of disc signal. No posterior disc  bulge. No significant facet arthropathy. No significant spinal canal  or  neuroforaminal stenosis at any level. T2/STIR hyperintense cord  signal change at the T10 level measures up to 2.7 cm craniocaudal,  with an enhancing focus that measures 1.9 cm craniocaudal. No  additional cord signal abnormality elsewhere. No additional foci of  abnormal enhancement. Remainder negative.    Lumbar spine MRI: Numbering shows 5 lumbar type vertebrae.  Unremarkable alignment. Maintained vertebral body heights.  Unremarkable marrow signal with no osseous edema or concerning marrow  signal abnormality. No significant degenerative change in the lumbar  spine with no high-grade spinal canal or neuroforaminal stenosis at  any level. Conus tip is at the L1-L2 level. No abnormal enhancement or  thickening of the cauda equina nerve roots. Visualized bony pelvis is  unremarkable. Remainder negative.      Impression    IMPRESSION:   Head MRI:  1.  Interval development of a T2/FLAIR hyperintensity involving the  medulla, the medial aspect of the cerebellar hemispheres, and the  dorsal dhaval along the anterior margin of the fourth ventricle just to  the left of midline. There is an accompanying parenchymal enhancement  in these areas, with suggestion of a small amount of leptomeningeal  enhancement at the cervicomedullary junction. Given the submitted  clinical history, this raises concern for active demyelination in the  setting of ADEM or multiple sclerosis. Anti-MOG antibody disease could  generate a similar appearance. Given the possibility of a small amount  of leptomeningeal enhancement, consideration could be given towards  meningitis or possible sarcoid in the appropriate clinical setting.    Cervical spine MRI:  1.  Expansile T2 hyperintense cord signal abnormality extending from  the inferior C4 to the superior C7 level with rounded foci of  accompanying enhancement at the C4 and C5-C6 levels. This raises  concern for transverse myelitis, with the overall clinical picture  suggesting the possibility of  ADEM (or possible Anti-MOG antibody  disease) with cord involvement. Active demyelination in the setting of  multiple sclerosis should also be considered. Infectious transverse  myelitis is thought less likely but provides additional consideration.    Thoracic spine MRI:  1.  Enhancing T2/STIR hyperintense cord lesion at the T10 level  measuring up to 2.7 cm craniocaudal. No additional cord signal  abnormality or abnormal cord enhancement elsewhere. Differential  considerations as outlined in the cervical spine MRI impression still  apply.    Lumbar spine MRI:  1.  No abnormal enhancement in the cauda equina nerve roots or  visualized cord signal abnormality in the visualized inferior aspect  of the spinal cord.    2.  No significant degenerative change in the lumbar spine. Spinal  canal and neuroforamina are widely patent.    JESSI GRAVES MD         SYSTEM ID:  [id number]   Lumbar spine MRI w & w/o contrast - surgery <10yrs    Narrative    MR BRAIN WITHOUT AND WITH CONTRAST   MR CERVICAL SPINE WITHOUT AND WITH CONTRAST   MR LUMBAR SPINE WITHOUT AND WITH CONTRAST   MR THORACIC SPINE WITHOUT AND WITH CONTRAST  11/26/2021 10:52 AM    INDICATION: Multiple sclerosis, new event. Neurological deficit,  acute, stroke suspected; headache, fever, leg weakness.    TECHNIQUE:   1. Brain MRI with and without IV contrast.  2. Cervical spine MRI with and without IV contrast.  3. Thoracic spine MRI without and with IV contrast.  4. Lumbar spine MRI without and with IV contrast.    CONTRAST: 8 mL Gadavist  COMPARISON: 9/12/2008 brain MRI.    FINDINGS:    HEAD MRI:  No finding for acute infarct, intracranial hemorrhage, or extra-axial  collection. Interval development of a T2/FLAIR hyperintensity in the  ventral right medulla oblongata with a small focus of left lateral  involvement and dorsal involvement extending into the inferior  cerebellar peduncles bilaterally. This is accompanied by a small  amount of T2 hyperintensity in  the medial cerebellar hemispheres  bilaterally with involvement of the lateral margins of the fourth  ventricle. Additional punctate focus of T2 hyperintensity along the  ventral margin of the fourth ventricle, seen to the left of midline.  This is accompanied by a small amount of parenchymal enhancement along  the ventral left lateral margin of the fourth ventricle and the medial  aspect of the bilateral cerebellar hemispheres, with parenchymal and  leptomeningeal enhancement at the medulla extending to the  cervicomedullary junction.    No additional parenchymal signal abnormality elsewhere. Normal  ventricular size and configuration with no parenchymal volume loss.  Major intracranial vascular flow voids are preserved. Cerebellar  tonsils are normally positioned.    Trace mucosal thickening in the ethmoid air cells. No fluid in the  mastoid air cells. Unremarkable orbits. Remainder negative.    Cervical spine MRI:  Straightening of the usual cervical lordosis with otherwise  unremarkable alignment. Maintained vertebral body heights.  Unremarkable marrow signal. No osseous edema. No extraspinal  abnormality. Disc space heights are preserved at all levels. Small  posterior disc osteophyte complex at C5-C6 with no significant  posterior disc osteophyte complex elsewhere. No significant facet  arthropathy. Mild C5-C6 spinal canal stenosis. No significant  neuroforaminal stenosis at any level. Expansile T2 hyperintense cord  signal abnormality extending from the C4 level to the C6-C7 level,  with a rounded focus of enhancement at the C4 level measuring 8 mm  craniocaudal and 4 mm AP, and a rounded focus of enhancement at the  C5-C6 level measuring 13 mm cranial caudal and 8 mm AP.    Thoracic spine MRI:  Unremarkable alignment. Maintained vertebral body heights.  Unremarkable marrow signal. No osseous edema. No abnormal enhancement.  Disc heights are preserved at all levels. Disc desiccation at the  T10-T11 level with  only mild loss of disc signal. No posterior disc  bulge. No significant facet arthropathy. No significant spinal canal  or neuroforaminal stenosis at any level. T2/STIR hyperintense cord  signal change at the T10 level measures up to 2.7 cm craniocaudal,  with an enhancing focus that measures 1.9 cm craniocaudal. No  additional cord signal abnormality elsewhere. No additional foci of  abnormal enhancement. Remainder negative.    Lumbar spine MRI: Numbering shows 5 lumbar type vertebrae.  Unremarkable alignment. Maintained vertebral body heights.  Unremarkable marrow signal with no osseous edema or concerning marrow  signal abnormality. No significant degenerative change in the lumbar  spine with no high-grade spinal canal or neuroforaminal stenosis at  any level. Conus tip is at the L1-L2 level. No abnormal enhancement or  thickening of the cauda equina nerve roots. Visualized bony pelvis is  unremarkable. Remainder negative.      Impression    IMPRESSION:   Head MRI:  1.  Interval development of a T2/FLAIR hyperintensity involving the  medulla, the medial aspect of the cerebellar hemispheres, and the  dorsal dhaval along the anterior margin of the fourth ventricle just to  the left of midline. There is an accompanying parenchymal enhancement  in these areas, with suggestion of a small amount of leptomeningeal  enhancement at the cervicomedullary junction. Given the submitted  clinical history, this raises concern for active demyelination in the  setting of ADEM or multiple sclerosis. Anti-MOG antibody disease could  generate a similar appearance. Given the possibility of a small amount  of leptomeningeal enhancement, consideration could be given towards  meningitis or possible sarcoid in the appropriate clinical setting.    Cervical spine MRI:  1.  Expansile T2 hyperintense cord signal abnormality extending from  the inferior C4 to the superior C7 level with rounded foci of  accompanying enhancement at the C4 and  C5-C6 levels. This raises  concern for transverse myelitis, with the overall clinical picture  suggesting the possibility of ADEM (or possible Anti-MOG antibody  disease) with cord involvement. Active demyelination in the setting of  multiple sclerosis should also be considered. Infectious transverse  myelitis is thought less likely but provides additional consideration.    Thoracic spine MRI:  1.  Enhancing T2/STIR hyperintense cord lesion at the T10 level  measuring up to 2.7 cm craniocaudal. No additional cord signal  abnormality or abnormal cord enhancement elsewhere. Differential  considerations as outlined in the cervical spine MRI impression still  apply.    Lumbar spine MRI:  1.  No abnormal enhancement in the cauda equina nerve roots or  visualized cord signal abnormality in the visualized inferior aspect  of the spinal cord.    2.  No significant degenerative change in the lumbar spine. Spinal  canal and neuroforamina are widely patent.    JESSI GRAVES MD         SYSTEM ID:  [id number]   MR Orbit w/o & w Contrast    Narrative    EXAM: MR ORBIT W/O and W CONTRAST  LOCATION: [facility]  DATE/TIME: 11/27/2021 12:08 PM    INDICATION: Optic neuritis, right sided vision loss over last 2 weeks  COMPARISON: MRI brain performed the day prior.  CONTRAST: 7.5 mL Gadavist  TECHNIQUE: MRI orbits without and with IV contrast.    FINDINGS:  No abnormal restricted diffusion to suggest acute infarct.    Orbits: The globes are unremarkable.  Intra orbital and canalicular segments of the optic nerve are unremarkable. Extraocular muscles normal dimension, signal intensity and contrast enhancement. Remaining intraconal and extraconal contents unremarkable.   Bony orbital vault intact. Visualized nasolacrimal apparatus are unremarkable. Visualized ophthalmic vessels normal caliber and contrast enhancement.      Impression    IMPRESSION:    1.  Negative MRI of the orbits. No MR findings to suggest optic  neuritis.       Discharge Medications   Current Discharge Medication List      START taking these medications    Details   polyethylene glycol (MIRALAX) 17 GM/Dose powder Take 17 g by mouth daily  Qty: 510 g, Refills: 1    Associated Diagnoses: Constipation, unspecified constipation type      senna-docusate (SENOKOT-S/PERICOLACE) 8.6-50 MG tablet Take 1 tablet by mouth 2 times daily as needed for constipation  Qty: 60 tablet, Refills: 1    Associated Diagnoses: Constipation, unspecified constipation type         CONTINUE these medications which have NOT CHANGED    Details   acetaminophen 500 MG CAPS Take 2 capsules by mouth every 8 hours as needed For aches, pain, fever  Qty: 60 capsule, Refills: 0      ergocalciferol (ERGOCALCIFEROL) 1.25 MG (89363 UT) capsule Take 50,000 Units by mouth once a week      ibuprofen (ADVIL/MOTRIN) 600 MG tablet Take 1 tablet (600 mg) by mouth every 6 hours as needed for fever Take with food.  Qty: 30 tablet, Refills: 0           Allergies   Allergies   Allergen Reactions     Dog Epithelium Itching and Shortness Of Breath     Other reaction(s): Cough     Uncaria Tomentosa (Cats Claw) Cough, Itching and Shortness Of Breath

## 2021-11-30 NOTE — PLAN OF CARE
Patient's After Visit Summary was reviewed with patient.   Patient verbalized understanding of After Visit Summary, recommended follow up and was given an opportunity to ask questions.   Discharge medications sent home with patient/family: YES, miralax and senna  Discharged with other:family    OBSERVATION patient END time: 1500

## 2021-11-30 NOTE — PLAN OF CARE
"[id number]    Inpatient Progress Note:    /72 (BP Location: Left arm)   Pulse 59   Temp 98  F (36.7  C) (Oral)   Resp 18   Ht 1.676 m (5' 6\")   Wt 69.2 kg (152 lb 8 oz)   SpO2 97%   BMI 24.61 kg/m       Orientation: alert and oriented x4  Neuro: intermittent numbness/tingling to BLE, pt reports significant improvement and noted on exam  Pain status: denies  Activity: SBA, refuses walker  Resp: WDL, LS clear and equal bilaterally  Cardiac: WDL  GI: WDL, miralax and senna administered with BM last evening, BS active in all quadrants, denies nausea  : WDL  Skin: WDL  LDA: IV removed after IV solumedrol, refusing IV between scheduled doses  Infusions: 1000 mg IV solumedrol Q24H  Diet: regular  Consults: neurology, PT/OT, SW  Discharge Plan: continue high-dose IV solumedrol - day 5/5 today, await further neurology recommendations, PT recommending ARU at discharge - SW aware    Will continue to monitor and provide cares.     Miguelina James, RN         "

## 2021-11-30 NOTE — PROGRESS NOTES
Care Management Follow Up    Length of Stay (days): 4    Expected Discharge Date: 12/01/2021     Concerns to be Addressed: discharge planning     Patient plan of care discussed at interdisciplinary rounds: Yes    Anticipated Discharge Disposition: Acute Rehab    Additional Information:  [facility] said wont have bed until at least Thursday of this week.  [facility] will pursue insurance auth, but said pt might improve too much to meet criteria before they have a bed available.      Aysha Olivia RN, BSN, PHN, [facility]  Care Coordinator  [facility]  [phone]

## 2021-12-01 ENCOUNTER — PATIENT OUTREACH (OUTPATIENT)
Dept: CARE COORDINATION | Facility: CLINIC | Age: 36
End: 2021-12-01
Payer: COMMERCIAL

## 2021-12-01 DIAGNOSIS — Z71.89 OTHER SPECIFIED COUNSELING: ICD-10-CM

## 2021-12-01 LAB
BACTERIA BLD CULT: NO GROWTH
BACTERIA BLD CULT: NO GROWTH
BACTERIA CSF CULT: NO GROWTH
GRAM STAIN RESULT: NORMAL
GRAM STAIN RESULT: NORMAL

## 2021-12-01 NOTE — PLAN OF CARE
Physical Therapy Discharge Summary    Reason for therapy discharge:    Discharged to home.    Progress towards therapy goal(s). See goals on Care Plan in [facility] electronic health record for goal details.  Goals not met.  Barriers to achieving goals:   discharge from facility.    Therapy recommendation(s):    Continued therapy is recommended.  Rationale/Recommendations:  ARU was recommended, however pt discharged home.      **Pt not seen by discharging therapist on this date, note written based on previous treating therapist's notes and recommendations

## 2021-12-01 NOTE — PROGRESS NOTES
Clinic Care Coordination Contact  [facility]/Voicemail       Clinical Data: Care Coordinator Outreach  Outreach attempted x 1.  Left message on patient's voicemail with call back information and requested return call.  Plan: Care Coordinator will try to reach patient again in 1-2 business days.    Radha Gramajo  Community Health Worker  [facility]  [phone]

## 2021-12-02 NOTE — PROGRESS NOTES
Clinic Care Coordination Contact  [facility]/Voicemail       Clinical Data: Care Coordinator Outreach  Outreach attempted x 2.  Left message on patient's voicemail with call back information and requested return call.  Plan:Care Coordinator will do no further outreaches at this time.        MANUEL Fagan  [phone]  [facility]

## 2021-12-02 NOTE — PLAN OF CARE
"Patient stated he will no longer be leaving hospital because he is \"tired\" and his \"landlord can wait\" until he is ready. Patient stated he will be leaving tomorrow instead. However, patient informed that he is not able to leave whenever he wants to and explained the processing it went through to give him this exception in the first place. RN told patient to let staff know immediately if he would be changing his mind and leaving at some point tonight. Patient reminded that all of the rules needed to be followed and patient stated he would let staff know if he ended up leaving. Will continue with plan of care.  "

## 2021-12-03 LAB — MAYO MISC RESULT: NORMAL

## 2021-12-05 LAB — MAYO MISC RESULT: NORMAL

## 2022-05-20 NOTE — PHARMACY-ADMISSION MEDICATION HISTORY
Admission medication history interview status for this patient is complete. See [facility] admission navigator for allergy information, prior to admission medications and immunization status.     Medication history interview done, indicate source(s): Patient  Medication history resources (including written lists, pill bottles, clinic record):None  Pharmacy: n/a    Changes made to PTA medication list:  Added: ergocalciferol  Changed: none  Reported as Not Taking: none  Removed: none    Actions taken by pharmacist (provider contacted, etc):None     Additional medication history information:None    Medication reconciliation/reorder completed by provider prior to medication history?  N   (Y/N)         Prior to Admission medications    Medication Sig Last Dose Taking? Auth Provider   acetaminophen 500 MG CAPS Take 2 capsules by mouth every 8 hours as needed For aches, pain, fever Past Month at Unknown time Yes Dimas Romero MD   ergocalciferol (ERGOCALCIFEROL) 1.25 MG (35581 UT) capsule Take 50,000 Units by mouth once a week 11/22/2021 Yes Unknown, Entered By History   ibuprofen (ADVIL/MOTRIN) 600 MG tablet Take 1 tablet (600 mg) by mouth every 6 hours as needed for fever Take with food. Past Month at Unknown time Yes Dimas Romero MD          Assessment/Plan:      Diagnoses and all orders for this visit:    Eustachian tube dysfunction, right  Comments:  start nasal spray, call within 1-2 weeks if no improvement and can consider oral steriod if needed; no sign of infxn requiring abx at this time but monitor for pain and or fevers   Orders:  -     fluticasone (FLONASE) 50 mcg/act nasal spray; 2 sprays into each nostril in the morning  Subjective:     Patient ID: Jorden Rivera is a 47 y o  male  Chief Complaint   Patient presents with    Ear Fullness     Patient states right ear has been blocked for 3 weeks  No refills needed  HPI   Patient is a 48 yo male who presents for sensation of R ear being blocked since Monday  He started with a cold 3 weeks ago and now clearing up  His congestion is clearing up but still has slight lingering dry cough  He has been taking otc alkaseltzer  No fevers, ear pain, discharge  No sob, wheezing  He appreciates ringing in R ear as well since being blocked up  No change in hearing  Review of Systems   Constitutional: Negative  HENT: Positive for tinnitus  Negative for ear discharge, ear pain (no pain but blocked) and hearing loss  Congestion: improved  Respiratory: Positive for cough  Negative for shortness of breath and wheezing  Cardiovascular: Negative  Neurological: Negative  Objective:  Vitals:    05/20/22 1611   BP: 138/86   Pulse: 68   Temp: (!) 97 3 °F (36 3 °C)   SpO2: 98%        Physical Exam  Constitutional:       General: He is not in acute distress  Appearance: Normal appearance  HENT:      Head: Normocephalic and atraumatic  Right Ear: Tympanic membrane, ear canal and external ear normal       Left Ear: Tympanic membrane, ear canal and external ear normal       Ears:      Comments: R ear effusion     Nose: Nose normal       Mouth/Throat:      Mouth: Mucous membranes are moist       Pharynx: Oropharynx is clear     Eyes:      Conjunctiva/sclera: Conjunctivae normal       Pupils: Pupils are equal, round, and reactive to light  Cardiovascular:      Rate and Rhythm: Normal rate and regular rhythm  Heart sounds: No murmur heard  Pulmonary:      Effort: Pulmonary effort is normal  No respiratory distress  Breath sounds: Normal breath sounds  No wheezing  Musculoskeletal:         General: No deformity  Skin:     General: Skin is warm and dry  Neurological:      General: No focal deficit present  Mental Status: He is alert and oriented to person, place, and time     Psychiatric:         Mood and Affect: Mood normal          Behavior: Behavior normal